# Patient Record
Sex: FEMALE | Race: BLACK OR AFRICAN AMERICAN | NOT HISPANIC OR LATINO | URBAN - METROPOLITAN AREA
[De-identification: names, ages, dates, MRNs, and addresses within clinical notes are randomized per-mention and may not be internally consistent; named-entity substitution may affect disease eponyms.]

---

## 2017-01-04 ENCOUNTER — COMMUNICATION - HEALTHEAST (OUTPATIENT)
Dept: BEHAVIORAL HEALTH | Facility: CLINIC | Age: 42
End: 2017-01-04

## 2017-01-04 DIAGNOSIS — F11.20 OPIOID USE DISORDER, SEVERE, DEPENDENCE (H): ICD-10-CM

## 2017-01-09 ENCOUNTER — AMBULATORY - HEALTHEAST (OUTPATIENT)
Dept: BEHAVIORAL HEALTH | Facility: CLINIC | Age: 42
End: 2017-01-09

## 2017-01-09 ENCOUNTER — COMMUNICATION - HEALTHEAST (OUTPATIENT)
Dept: BEHAVIORAL HEALTH | Facility: CLINIC | Age: 42
End: 2017-01-09

## 2017-01-09 DIAGNOSIS — F11.20 OPIOID USE DISORDER, SEVERE, DEPENDENCE (H): ICD-10-CM

## 2017-01-10 ENCOUNTER — COMMUNICATION - HEALTHEAST (OUTPATIENT)
Dept: BEHAVIORAL HEALTH | Facility: CLINIC | Age: 42
End: 2017-01-10

## 2017-01-10 DIAGNOSIS — F13.11 SEDATIVE, HYPNOTIC OR ANXIOLYTIC USE DISORDER, MILD, IN EARLY REMISSION (H): ICD-10-CM

## 2017-01-18 ENCOUNTER — AMBULATORY - HEALTHEAST (OUTPATIENT)
Dept: LAB | Facility: CLINIC | Age: 42
End: 2017-01-18

## 2017-01-18 ENCOUNTER — COMMUNICATION - HEALTHEAST (OUTPATIENT)
Dept: BEHAVIORAL HEALTH | Facility: CLINIC | Age: 42
End: 2017-01-18

## 2017-01-18 DIAGNOSIS — F41.1 GENERALIZED ANXIETY DISORDER: ICD-10-CM

## 2017-01-18 DIAGNOSIS — F13.10 SEDATIVE, HYPNOTIC OR ANXIOLYTIC USE DISORDER, MILD, ABUSE (H): ICD-10-CM

## 2017-01-18 DIAGNOSIS — F11.20 OPIOID USE DISORDER, SEVERE, DEPENDENCE (H): ICD-10-CM

## 2017-01-19 ENCOUNTER — COMMUNICATION - HEALTHEAST (OUTPATIENT)
Dept: BEHAVIORAL HEALTH | Facility: CLINIC | Age: 42
End: 2017-01-19

## 2017-01-25 ENCOUNTER — COMMUNICATION - HEALTHEAST (OUTPATIENT)
Dept: BEHAVIORAL HEALTH | Facility: CLINIC | Age: 42
End: 2017-01-25

## 2017-01-25 DIAGNOSIS — F31.9 BIPOLAR DISORDER, UNSPECIFIED (H): ICD-10-CM

## 2017-01-26 ENCOUNTER — COMMUNICATION - HEALTHEAST (OUTPATIENT)
Dept: BEHAVIORAL HEALTH | Facility: CLINIC | Age: 42
End: 2017-01-26

## 2017-01-26 ENCOUNTER — AMBULATORY - HEALTHEAST (OUTPATIENT)
Dept: LAB | Facility: CLINIC | Age: 42
End: 2017-01-26

## 2017-01-26 DIAGNOSIS — F11.20 OPIOID USE DISORDER, SEVERE, DEPENDENCE (H): ICD-10-CM

## 2017-02-01 ENCOUNTER — AMBULATORY - HEALTHEAST (OUTPATIENT)
Dept: BEHAVIORAL HEALTH | Facility: CLINIC | Age: 42
End: 2017-02-01

## 2017-02-01 DIAGNOSIS — F11.20 OPIOID USE DISORDER, SEVERE, DEPENDENCE (H): ICD-10-CM

## 2017-02-01 ASSESSMENT — MIFFLIN-ST. JEOR: SCORE: 1270.5

## 2017-02-02 ENCOUNTER — COMMUNICATION - HEALTHEAST (OUTPATIENT)
Dept: BEHAVIORAL HEALTH | Facility: CLINIC | Age: 42
End: 2017-02-02

## 2017-02-06 ENCOUNTER — RECORDS - HEALTHEAST (OUTPATIENT)
Dept: ADMINISTRATIVE | Facility: OTHER | Age: 42
End: 2017-02-06

## 2017-02-23 ENCOUNTER — COMMUNICATION - HEALTHEAST (OUTPATIENT)
Dept: BEHAVIORAL HEALTH | Facility: CLINIC | Age: 42
End: 2017-02-23

## 2017-02-23 DIAGNOSIS — F31.9 BIPOLAR DISORDER, UNSPECIFIED (H): ICD-10-CM

## 2017-02-23 DIAGNOSIS — F41.1 GENERALIZED ANXIETY DISORDER: ICD-10-CM

## 2017-02-23 DIAGNOSIS — F13.11 SEDATIVE, HYPNOTIC OR ANXIOLYTIC USE DISORDER, MILD, IN EARLY REMISSION (H): ICD-10-CM

## 2017-02-23 DIAGNOSIS — F13.10 SEDATIVE, HYPNOTIC OR ANXIOLYTIC USE DISORDER, MILD, ABUSE (H): ICD-10-CM

## 2017-02-27 ENCOUNTER — COMMUNICATION - HEALTHEAST (OUTPATIENT)
Dept: BEHAVIORAL HEALTH | Facility: CLINIC | Age: 42
End: 2017-02-27

## 2017-02-28 ENCOUNTER — AMBULATORY - HEALTHEAST (OUTPATIENT)
Dept: BEHAVIORAL HEALTH | Facility: CLINIC | Age: 42
End: 2017-02-28

## 2017-02-28 DIAGNOSIS — F33.9 DEPRESSION, MAJOR, RECURRENT (H): ICD-10-CM

## 2017-02-28 DIAGNOSIS — F11.20 OPIOID USE DISORDER, SEVERE, DEPENDENCE (H): ICD-10-CM

## 2017-02-28 ASSESSMENT — MIFFLIN-ST. JEOR: SCORE: 1261.43

## 2017-03-15 ENCOUNTER — OFFICE VISIT - HEALTHEAST (OUTPATIENT)
Dept: BEHAVIORAL HEALTH | Facility: CLINIC | Age: 42
End: 2017-03-15

## 2017-03-15 DIAGNOSIS — F11.21 OPIOID USE DISORDER, SEVERE, IN SUSTAINED REMISSION, ON MAINTENANCE THERAPY (H): ICD-10-CM

## 2017-03-15 DIAGNOSIS — F11.20 OPIOID USE DISORDER, SEVERE, DEPENDENCE (H): ICD-10-CM

## 2017-03-15 DIAGNOSIS — F41.1 GENERALIZED ANXIETY DISORDER: ICD-10-CM

## 2017-03-15 DIAGNOSIS — F13.11 SEDATIVE, HYPNOTIC OR ANXIOLYTIC USE DISORDER, MILD, IN EARLY REMISSION (H): ICD-10-CM

## 2017-03-15 ASSESSMENT — MIFFLIN-ST. JEOR: SCORE: 1334.01

## 2017-03-27 ENCOUNTER — COMMUNICATION - HEALTHEAST (OUTPATIENT)
Dept: BEHAVIORAL HEALTH | Facility: CLINIC | Age: 42
End: 2017-03-27

## 2017-03-27 DIAGNOSIS — F41.1 GENERALIZED ANXIETY DISORDER: ICD-10-CM

## 2017-03-27 DIAGNOSIS — F13.10 SEDATIVE, HYPNOTIC OR ANXIOLYTIC USE DISORDER, MILD, ABUSE (H): ICD-10-CM

## 2017-04-10 ENCOUNTER — AMBULATORY - HEALTHEAST (OUTPATIENT)
Dept: BEHAVIORAL HEALTH | Facility: CLINIC | Age: 42
End: 2017-04-10

## 2017-04-10 DIAGNOSIS — F31.9 BIPOLAR DISORDER, UNSPECIFIED (H): ICD-10-CM

## 2017-04-10 DIAGNOSIS — G25.0 ESSENTIAL TREMOR: ICD-10-CM

## 2017-04-10 DIAGNOSIS — F11.20 OPIOID USE DISORDER, SEVERE, DEPENDENCE (H): ICD-10-CM

## 2017-04-10 DIAGNOSIS — F41.1 GENERALIZED ANXIETY DISORDER: ICD-10-CM

## 2017-04-10 DIAGNOSIS — G47.00 PERSISTENT DISORDER OF INITIATING OR MAINTAINING SLEEP: ICD-10-CM

## 2017-04-10 DIAGNOSIS — F13.11 SEDATIVE, HYPNOTIC OR ANXIOLYTIC USE DISORDER, MILD, IN EARLY REMISSION (H): ICD-10-CM

## 2017-04-10 DIAGNOSIS — F33.9 DEPRESSION, MAJOR, RECURRENT (H): ICD-10-CM

## 2017-04-10 ASSESSMENT — MIFFLIN-ST. JEOR: SCORE: 1315.86

## 2017-04-28 ENCOUNTER — COMMUNICATION - HEALTHEAST (OUTPATIENT)
Dept: BEHAVIORAL HEALTH | Facility: CLINIC | Age: 42
End: 2017-04-28

## 2017-04-28 DIAGNOSIS — F13.10 SEDATIVE, HYPNOTIC OR ANXIOLYTIC USE DISORDER, MILD, ABUSE (H): ICD-10-CM

## 2017-04-28 DIAGNOSIS — F41.1 GENERALIZED ANXIETY DISORDER: ICD-10-CM

## 2017-05-08 ENCOUNTER — AMBULATORY - HEALTHEAST (OUTPATIENT)
Dept: BEHAVIORAL HEALTH | Facility: CLINIC | Age: 42
End: 2017-05-08

## 2017-05-08 DIAGNOSIS — K21.9 GERD (GASTROESOPHAGEAL REFLUX DISEASE): ICD-10-CM

## 2017-05-08 DIAGNOSIS — F41.1 GENERALIZED ANXIETY DISORDER: ICD-10-CM

## 2017-05-08 DIAGNOSIS — F13.11 SEDATIVE, HYPNOTIC OR ANXIOLYTIC USE DISORDER, MILD, IN EARLY REMISSION (H): ICD-10-CM

## 2017-05-08 DIAGNOSIS — F31.9 BIPOLAR DISORDER, UNSPECIFIED (H): ICD-10-CM

## 2017-05-08 DIAGNOSIS — F33.9 DEPRESSION, MAJOR, RECURRENT (H): ICD-10-CM

## 2017-05-08 DIAGNOSIS — G25.0 ESSENTIAL TREMOR: ICD-10-CM

## 2017-05-08 DIAGNOSIS — F11.20 OPIOID USE DISORDER, SEVERE, DEPENDENCE (H): ICD-10-CM

## 2017-05-08 ASSESSMENT — MIFFLIN-ST. JEOR: SCORE: 1320.4

## 2017-05-30 ENCOUNTER — COMMUNICATION - HEALTHEAST (OUTPATIENT)
Dept: BEHAVIORAL HEALTH | Facility: CLINIC | Age: 42
End: 2017-05-30

## 2017-05-30 DIAGNOSIS — F41.1 GENERALIZED ANXIETY DISORDER: ICD-10-CM

## 2017-05-30 DIAGNOSIS — F13.10 SEDATIVE, HYPNOTIC OR ANXIOLYTIC USE DISORDER, MILD, ABUSE (H): ICD-10-CM

## 2017-06-05 ENCOUNTER — AMBULATORY - HEALTHEAST (OUTPATIENT)
Dept: BEHAVIORAL HEALTH | Facility: CLINIC | Age: 42
End: 2017-06-05

## 2017-06-05 DIAGNOSIS — F11.20 OPIOID USE DISORDER, SEVERE, DEPENDENCE (H): ICD-10-CM

## 2017-06-05 DIAGNOSIS — F13.11 SEDATIVE, HYPNOTIC OR ANXIOLYTIC USE DISORDER, MILD, IN EARLY REMISSION (H): ICD-10-CM

## 2017-06-05 ASSESSMENT — MIFFLIN-ST. JEOR: SCORE: 1320.4

## 2017-06-29 ENCOUNTER — COMMUNICATION - HEALTHEAST (OUTPATIENT)
Dept: BEHAVIORAL HEALTH | Facility: CLINIC | Age: 42
End: 2017-06-29

## 2017-06-29 DIAGNOSIS — F13.10 SEDATIVE, HYPNOTIC OR ANXIOLYTIC USE DISORDER, MILD, ABUSE (H): ICD-10-CM

## 2017-06-29 DIAGNOSIS — F41.1 GENERALIZED ANXIETY DISORDER: ICD-10-CM

## 2017-07-01 ENCOUNTER — COMMUNICATION - HEALTHEAST (OUTPATIENT)
Dept: BEHAVIORAL HEALTH | Facility: CLINIC | Age: 42
End: 2017-07-01

## 2017-07-01 DIAGNOSIS — G25.0 ESSENTIAL TREMOR: ICD-10-CM

## 2017-07-01 DIAGNOSIS — F31.9 BIPOLAR DISORDER, UNSPECIFIED (H): ICD-10-CM

## 2017-07-01 DIAGNOSIS — F33.9 DEPRESSION, MAJOR, RECURRENT (H): ICD-10-CM

## 2017-07-01 DIAGNOSIS — F41.1 GENERALIZED ANXIETY DISORDER: ICD-10-CM

## 2017-07-05 ENCOUNTER — COMMUNICATION - HEALTHEAST (OUTPATIENT)
Dept: BEHAVIORAL HEALTH | Facility: CLINIC | Age: 42
End: 2017-07-05

## 2017-07-05 DIAGNOSIS — F11.20 OPIOID USE DISORDER, SEVERE, DEPENDENCE (H): ICD-10-CM

## 2017-07-10 ENCOUNTER — COMMUNICATION - HEALTHEAST (OUTPATIENT)
Dept: BEHAVIORAL HEALTH | Facility: CLINIC | Age: 42
End: 2017-07-10

## 2017-07-10 DIAGNOSIS — F13.11 SEDATIVE, HYPNOTIC OR ANXIOLYTIC USE DISORDER, MILD, IN EARLY REMISSION (H): ICD-10-CM

## 2017-07-11 ENCOUNTER — AMBULATORY - HEALTHEAST (OUTPATIENT)
Dept: LAB | Facility: CLINIC | Age: 42
End: 2017-07-11

## 2017-07-11 DIAGNOSIS — F11.20 OPIOID USE DISORDER, SEVERE, DEPENDENCE (H): ICD-10-CM

## 2017-07-23 ENCOUNTER — COMMUNICATION - HEALTHEAST (OUTPATIENT)
Dept: BEHAVIORAL HEALTH | Facility: CLINIC | Age: 42
End: 2017-07-23

## 2017-07-23 DIAGNOSIS — F13.11 SEDATIVE, HYPNOTIC OR ANXIOLYTIC USE DISORDER, MILD, IN EARLY REMISSION (H): ICD-10-CM

## 2017-07-26 ENCOUNTER — COMMUNICATION - HEALTHEAST (OUTPATIENT)
Dept: BEHAVIORAL HEALTH | Facility: CLINIC | Age: 42
End: 2017-07-26

## 2017-07-26 DIAGNOSIS — F13.10 SEDATIVE, HYPNOTIC OR ANXIOLYTIC USE DISORDER, MILD, ABUSE (H): ICD-10-CM

## 2017-07-26 DIAGNOSIS — F41.1 GENERALIZED ANXIETY DISORDER: ICD-10-CM

## 2017-08-09 ENCOUNTER — COMMUNICATION - HEALTHEAST (OUTPATIENT)
Dept: BEHAVIORAL HEALTH | Facility: CLINIC | Age: 42
End: 2017-08-09

## 2017-08-09 ENCOUNTER — AMBULATORY - HEALTHEAST (OUTPATIENT)
Dept: BEHAVIORAL HEALTH | Facility: CLINIC | Age: 42
End: 2017-08-09

## 2017-08-09 DIAGNOSIS — F11.20 OPIOID USE DISORDER, SEVERE, DEPENDENCE (H): ICD-10-CM

## 2017-08-09 DIAGNOSIS — F31.9 BIPOLAR DISORDER, UNSPECIFIED (H): ICD-10-CM

## 2017-08-09 ASSESSMENT — MIFFLIN-ST. JEOR: SCORE: 1309.61

## 2017-08-24 ENCOUNTER — COMMUNICATION - HEALTHEAST (OUTPATIENT)
Dept: BEHAVIORAL HEALTH | Facility: CLINIC | Age: 42
End: 2017-08-24

## 2017-08-24 DIAGNOSIS — F13.11 SEDATIVE, HYPNOTIC OR ANXIOLYTIC USE DISORDER, MILD, IN EARLY REMISSION (H): ICD-10-CM

## 2017-08-24 DIAGNOSIS — F13.10 SEDATIVE, HYPNOTIC OR ANXIOLYTIC USE DISORDER, MILD, ABUSE (H): ICD-10-CM

## 2017-08-24 DIAGNOSIS — F41.1 GENERALIZED ANXIETY DISORDER: ICD-10-CM

## 2017-08-27 ENCOUNTER — COMMUNICATION - HEALTHEAST (OUTPATIENT)
Dept: BEHAVIORAL HEALTH | Facility: CLINIC | Age: 42
End: 2017-08-27

## 2017-08-27 DIAGNOSIS — F33.9 DEPRESSION, MAJOR, RECURRENT (H): ICD-10-CM

## 2017-08-27 DIAGNOSIS — F31.9 BIPOLAR DISORDER, UNSPECIFIED (H): ICD-10-CM

## 2017-08-27 DIAGNOSIS — G25.0 ESSENTIAL TREMOR: ICD-10-CM

## 2017-08-27 DIAGNOSIS — F41.1 GENERALIZED ANXIETY DISORDER: ICD-10-CM

## 2017-09-06 ENCOUNTER — AMBULATORY - HEALTHEAST (OUTPATIENT)
Dept: LAB | Facility: CLINIC | Age: 42
End: 2017-09-06

## 2017-09-06 ENCOUNTER — COMMUNICATION - HEALTHEAST (OUTPATIENT)
Dept: BEHAVIORAL HEALTH | Facility: CLINIC | Age: 42
End: 2017-09-06

## 2017-09-06 DIAGNOSIS — F11.20 OPIOID USE DISORDER, SEVERE, DEPENDENCE (H): ICD-10-CM

## 2017-09-20 ENCOUNTER — COMMUNICATION - HEALTHEAST (OUTPATIENT)
Dept: BEHAVIORAL HEALTH | Facility: CLINIC | Age: 42
End: 2017-09-20

## 2017-09-20 DIAGNOSIS — F41.1 GENERALIZED ANXIETY DISORDER: ICD-10-CM

## 2017-09-20 DIAGNOSIS — F13.10 SEDATIVE, HYPNOTIC OR ANXIOLYTIC USE DISORDER, MILD, ABUSE (H): ICD-10-CM

## 2017-10-02 ENCOUNTER — COMMUNICATION - HEALTHEAST (OUTPATIENT)
Dept: BEHAVIORAL HEALTH | Facility: CLINIC | Age: 42
End: 2017-10-02

## 2017-10-02 DIAGNOSIS — F41.1 GENERALIZED ANXIETY DISORDER: ICD-10-CM

## 2017-10-02 DIAGNOSIS — G25.0 ESSENTIAL TREMOR: ICD-10-CM

## 2017-10-02 DIAGNOSIS — F33.9 DEPRESSION, MAJOR, RECURRENT (H): ICD-10-CM

## 2017-10-04 ENCOUNTER — AMBULATORY - HEALTHEAST (OUTPATIENT)
Dept: BEHAVIORAL HEALTH | Facility: CLINIC | Age: 42
End: 2017-10-04

## 2017-10-04 ENCOUNTER — OFFICE VISIT - HEALTHEAST (OUTPATIENT)
Dept: BEHAVIORAL HEALTH | Facility: CLINIC | Age: 42
End: 2017-10-04

## 2017-10-04 DIAGNOSIS — F11.20 OPIOID USE DISORDER, SEVERE, DEPENDENCE (H): ICD-10-CM

## 2017-10-04 DIAGNOSIS — F13.11 SEDATIVE, HYPNOTIC OR ANXIOLYTIC USE DISORDER, MILD, IN EARLY REMISSION (H): ICD-10-CM

## 2017-10-04 DIAGNOSIS — F33.1 DEPRESSION, MAJOR, RECURRENT, MODERATE (H): ICD-10-CM

## 2017-10-04 DIAGNOSIS — F11.21 OPIOID USE DISORDER, SEVERE, IN SUSTAINED REMISSION, ON MAINTENANCE THERAPY (H): ICD-10-CM

## 2017-10-04 DIAGNOSIS — Z79.899 POLYPHARMACY: ICD-10-CM

## 2017-10-04 ASSESSMENT — MIFFLIN-ST. JEOR: SCORE: 1290.92

## 2017-10-12 ENCOUNTER — AMBULATORY - HEALTHEAST (OUTPATIENT)
Dept: BEHAVIORAL HEALTH | Facility: CLINIC | Age: 42
End: 2017-10-12

## 2017-10-12 DIAGNOSIS — F11.20 OPIOID USE DISORDER, SEVERE, DEPENDENCE (H): ICD-10-CM

## 2017-10-12 ASSESSMENT — MIFFLIN-ST. JEOR: SCORE: 1263.7

## 2017-10-16 ENCOUNTER — COMMUNICATION - HEALTHEAST (OUTPATIENT)
Dept: BEHAVIORAL HEALTH | Facility: CLINIC | Age: 42
End: 2017-10-16

## 2017-10-16 DIAGNOSIS — F13.10 SEDATIVE, HYPNOTIC OR ANXIOLYTIC USE DISORDER, MILD, ABUSE (H): ICD-10-CM

## 2017-10-16 DIAGNOSIS — F41.1 GENERALIZED ANXIETY DISORDER: ICD-10-CM

## 2017-10-19 ENCOUNTER — AMBULATORY - HEALTHEAST (OUTPATIENT)
Dept: BEHAVIORAL HEALTH | Facility: CLINIC | Age: 42
End: 2017-10-19

## 2017-10-19 DIAGNOSIS — F11.20 OPIOID USE DISORDER, SEVERE, DEPENDENCE (H): ICD-10-CM

## 2017-10-19 ASSESSMENT — MIFFLIN-ST. JEOR: SCORE: 1295.46

## 2017-10-25 ENCOUNTER — AMBULATORY - HEALTHEAST (OUTPATIENT)
Dept: BEHAVIORAL HEALTH | Facility: CLINIC | Age: 42
End: 2017-10-25

## 2017-10-25 DIAGNOSIS — F11.21 OPIOID USE DISORDER, SEVERE, IN SUSTAINED REMISSION, ON MAINTENANCE THERAPY (H): ICD-10-CM

## 2017-10-25 DIAGNOSIS — F11.20 OPIOID USE DISORDER, SEVERE, DEPENDENCE (H): ICD-10-CM

## 2017-10-25 ASSESSMENT — MIFFLIN-ST. JEOR: SCORE: 1273.14

## 2017-10-26 ENCOUNTER — AMBULATORY - HEALTHEAST (OUTPATIENT)
Dept: BEHAVIORAL HEALTH | Facility: CLINIC | Age: 42
End: 2017-10-26

## 2017-11-01 ENCOUNTER — COMMUNICATION - HEALTHEAST (OUTPATIENT)
Dept: BEHAVIORAL HEALTH | Facility: CLINIC | Age: 42
End: 2017-11-01

## 2017-11-02 ENCOUNTER — COMMUNICATION - HEALTHEAST (OUTPATIENT)
Dept: BEHAVIORAL HEALTH | Facility: CLINIC | Age: 42
End: 2017-11-02

## 2017-11-02 ENCOUNTER — AMBULATORY - HEALTHEAST (OUTPATIENT)
Dept: LAB | Facility: CLINIC | Age: 42
End: 2017-11-02

## 2017-11-02 DIAGNOSIS — F11.21 OPIOID USE DISORDER, SEVERE, IN SUSTAINED REMISSION, ON MAINTENANCE THERAPY (H): ICD-10-CM

## 2017-11-02 DIAGNOSIS — F11.20 OPIOID USE DISORDER, SEVERE, DEPENDENCE (H): ICD-10-CM

## 2017-11-03 ENCOUNTER — COMMUNICATION - HEALTHEAST (OUTPATIENT)
Dept: BEHAVIORAL HEALTH | Facility: CLINIC | Age: 42
End: 2017-11-03

## 2017-11-03 DIAGNOSIS — F11.20 OPIOID USE DISORDER, SEVERE, DEPENDENCE (H): ICD-10-CM

## 2017-11-14 ENCOUNTER — AMBULATORY - HEALTHEAST (OUTPATIENT)
Dept: BEHAVIORAL HEALTH | Facility: CLINIC | Age: 42
End: 2017-11-14

## 2017-11-14 DIAGNOSIS — F11.20 OPIOID USE DISORDER, SEVERE, DEPENDENCE (H): ICD-10-CM

## 2017-11-14 ASSESSMENT — MIFFLIN-ST. JEOR: SCORE: 1272.78

## 2017-11-15 ENCOUNTER — COMMUNICATION - HEALTHEAST (OUTPATIENT)
Dept: BEHAVIORAL HEALTH | Facility: CLINIC | Age: 42
End: 2017-11-15

## 2017-11-15 DIAGNOSIS — F41.1 GENERALIZED ANXIETY DISORDER: ICD-10-CM

## 2017-11-15 DIAGNOSIS — F13.10 SEDATIVE, HYPNOTIC OR ANXIOLYTIC USE DISORDER, MILD, ABUSE (H): ICD-10-CM

## 2017-11-30 ENCOUNTER — AMBULATORY - HEALTHEAST (OUTPATIENT)
Dept: BEHAVIORAL HEALTH | Facility: CLINIC | Age: 42
End: 2017-11-30

## 2017-11-30 DIAGNOSIS — F11.20 OPIOID USE DISORDER, SEVERE, DEPENDENCE (H): ICD-10-CM

## 2017-11-30 ASSESSMENT — MIFFLIN-ST. JEOR: SCORE: 1263.7

## 2017-12-14 ENCOUNTER — COMMUNICATION - HEALTHEAST (OUTPATIENT)
Dept: BEHAVIORAL HEALTH | Facility: CLINIC | Age: 42
End: 2017-12-14

## 2017-12-14 ENCOUNTER — AMBULATORY - HEALTHEAST (OUTPATIENT)
Dept: BEHAVIORAL HEALTH | Facility: CLINIC | Age: 42
End: 2017-12-14

## 2017-12-14 DIAGNOSIS — F11.20 OPIOID USE DISORDER, SEVERE, DEPENDENCE (H): ICD-10-CM

## 2017-12-14 DIAGNOSIS — F41.1 GENERALIZED ANXIETY DISORDER: ICD-10-CM

## 2017-12-14 DIAGNOSIS — F13.10 SEDATIVE, HYPNOTIC OR ANXIOLYTIC USE DISORDER, MILD, ABUSE (H): ICD-10-CM

## 2017-12-14 ASSESSMENT — MIFFLIN-ST. JEOR: SCORE: 1286.38

## 2017-12-27 ENCOUNTER — AMBULATORY - HEALTHEAST (OUTPATIENT)
Dept: BEHAVIORAL HEALTH | Facility: CLINIC | Age: 42
End: 2017-12-27

## 2018-01-02 ENCOUNTER — AMBULATORY - HEALTHEAST (OUTPATIENT)
Dept: BEHAVIORAL HEALTH | Facility: CLINIC | Age: 43
End: 2018-01-02

## 2018-01-02 DIAGNOSIS — F11.20 OPIOID USE DISORDER, SEVERE, DEPENDENCE (H): ICD-10-CM

## 2018-01-02 LAB
AMPHETAMINES UR QL SCN: NORMAL
BARBITURATES UR QL: NORMAL
BENZODIAZ UR QL: NORMAL
CANNABINOIDS UR QL SCN: NORMAL
COCAINE UR QL: NORMAL
CREAT UR-MCNC: 167.4 MG/DL
METHADONE UR QL SCN: NORMAL
OPIATES UR QL SCN: NORMAL
OXYCODONE UR QL: NORMAL
PCP UR QL SCN: NORMAL

## 2018-01-02 ASSESSMENT — MIFFLIN-ST. JEOR: SCORE: 1250.1

## 2018-01-08 ENCOUNTER — COMMUNICATION - HEALTHEAST (OUTPATIENT)
Dept: BEHAVIORAL HEALTH | Facility: CLINIC | Age: 43
End: 2018-01-08

## 2018-01-16 ENCOUNTER — COMMUNICATION - HEALTHEAST (OUTPATIENT)
Dept: BEHAVIORAL HEALTH | Facility: CLINIC | Age: 43
End: 2018-01-16

## 2018-01-16 DIAGNOSIS — F13.10 SEDATIVE, HYPNOTIC OR ANXIOLYTIC USE DISORDER, MILD, ABUSE (H): ICD-10-CM

## 2018-01-16 DIAGNOSIS — F11.20 OPIOID USE DISORDER, SEVERE, DEPENDENCE (H): ICD-10-CM

## 2018-01-16 DIAGNOSIS — F41.1 GENERALIZED ANXIETY DISORDER: ICD-10-CM

## 2018-01-26 ENCOUNTER — COMMUNICATION - HEALTHEAST (OUTPATIENT)
Dept: BEHAVIORAL HEALTH | Facility: CLINIC | Age: 43
End: 2018-01-26

## 2018-01-29 ENCOUNTER — COMMUNICATION - HEALTHEAST (OUTPATIENT)
Dept: BEHAVIORAL HEALTH | Facility: CLINIC | Age: 43
End: 2018-01-29

## 2018-01-31 ENCOUNTER — OFFICE VISIT - HEALTHEAST (OUTPATIENT)
Dept: BEHAVIORAL HEALTH | Facility: CLINIC | Age: 43
End: 2018-01-31

## 2018-01-31 DIAGNOSIS — F11.20 OPIOID USE DISORDER, SEVERE, DEPENDENCE (H): ICD-10-CM

## 2018-01-31 DIAGNOSIS — F11.21 OPIOID USE DISORDER, SEVERE, IN SUSTAINED REMISSION, ON MAINTENANCE THERAPY (H): ICD-10-CM

## 2018-01-31 LAB
AMPHETAMINES UR QL SCN: NORMAL
BARBITURATES UR QL: NORMAL
BENZODIAZ UR QL: NORMAL
BUPRENORPHINE QUAL URINE LHE: ABNORMAL
CANNABINOIDS UR QL SCN: NORMAL
COCAINE UR QL: NORMAL
CREAT UR-MCNC: 219.1 MG/DL
CREAT UR-MCNC: 219.9 MG/DL
METHADONE UR QL SCN: NORMAL
OPIATES UR QL SCN: NORMAL
OXYCODONE UR QL: NORMAL
PCP UR QL SCN: NORMAL

## 2018-01-31 ASSESSMENT — MIFFLIN-ST. JEOR: SCORE: 1281.85

## 2018-02-01 ENCOUNTER — COMMUNICATION - HEALTHEAST (OUTPATIENT)
Dept: BEHAVIORAL HEALTH | Facility: CLINIC | Age: 43
End: 2018-02-01

## 2018-02-04 ENCOUNTER — COMMUNICATION - HEALTHEAST (OUTPATIENT)
Dept: BEHAVIORAL HEALTH | Facility: CLINIC | Age: 43
End: 2018-02-04

## 2018-02-04 DIAGNOSIS — F13.10 SEDATIVE, HYPNOTIC OR ANXIOLYTIC USE DISORDER, MILD, ABUSE (H): ICD-10-CM

## 2018-02-06 ENCOUNTER — COMMUNICATION - HEALTHEAST (OUTPATIENT)
Dept: BEHAVIORAL HEALTH | Facility: CLINIC | Age: 43
End: 2018-02-06

## 2018-02-14 ENCOUNTER — COMMUNICATION - HEALTHEAST (OUTPATIENT)
Dept: BEHAVIORAL HEALTH | Facility: CLINIC | Age: 43
End: 2018-02-14

## 2018-02-14 DIAGNOSIS — F41.1 GENERALIZED ANXIETY DISORDER: ICD-10-CM

## 2018-02-14 DIAGNOSIS — F33.9 DEPRESSION, MAJOR, RECURRENT (H): ICD-10-CM

## 2018-02-14 DIAGNOSIS — F13.10 SEDATIVE, HYPNOTIC OR ANXIOLYTIC USE DISORDER, MILD, ABUSE (H): ICD-10-CM

## 2018-02-14 DIAGNOSIS — G25.0 ESSENTIAL TREMOR: ICD-10-CM

## 2018-03-06 ENCOUNTER — COMMUNICATION - HEALTHEAST (OUTPATIENT)
Dept: BEHAVIORAL HEALTH | Facility: CLINIC | Age: 43
End: 2018-03-06

## 2018-03-06 ENCOUNTER — AMBULATORY - HEALTHEAST (OUTPATIENT)
Dept: BEHAVIORAL HEALTH | Facility: CLINIC | Age: 43
End: 2018-03-06

## 2018-03-06 ENCOUNTER — AMBULATORY - HEALTHEAST (OUTPATIENT)
Dept: LAB | Facility: CLINIC | Age: 43
End: 2018-03-06

## 2018-03-06 DIAGNOSIS — F11.21 OPIOID USE DISORDER, SEVERE, IN SUSTAINED REMISSION, ON MAINTENANCE THERAPY (H): ICD-10-CM

## 2018-03-06 DIAGNOSIS — F11.20 OPIOID USE DISORDER, SEVERE, DEPENDENCE (H): ICD-10-CM

## 2018-03-06 LAB
AMPHETAMINES UR QL SCN: NORMAL
BARBITURATES UR QL: NORMAL
BENZODIAZ UR QL: NORMAL
BUPRENORPHINE QUAL URINE LHE: ABNORMAL
CANNABINOIDS UR QL SCN: NORMAL
COCAINE UR QL: NORMAL
CREAT UR-MCNC: 12.9 MG/DL
CREAT UR-MCNC: 12.9 MG/DL
METHADONE UR QL SCN: NORMAL
OPIATES UR QL SCN: NORMAL
OXYCODONE UR QL: NORMAL
PCP UR QL SCN: NORMAL

## 2018-03-15 ENCOUNTER — COMMUNICATION - HEALTHEAST (OUTPATIENT)
Dept: BEHAVIORAL HEALTH | Facility: CLINIC | Age: 43
End: 2018-03-15

## 2018-03-15 DIAGNOSIS — F41.1 GENERALIZED ANXIETY DISORDER: ICD-10-CM

## 2018-03-15 DIAGNOSIS — F13.10 SEDATIVE, HYPNOTIC OR ANXIOLYTIC USE DISORDER, MILD, ABUSE (H): ICD-10-CM

## 2018-03-15 DIAGNOSIS — F33.9 DEPRESSION, MAJOR, RECURRENT (H): ICD-10-CM

## 2018-03-15 DIAGNOSIS — G25.0 ESSENTIAL TREMOR: ICD-10-CM

## 2018-03-21 ENCOUNTER — COMMUNICATION - HEALTHEAST (OUTPATIENT)
Dept: BEHAVIORAL HEALTH | Facility: CLINIC | Age: 43
End: 2018-03-21

## 2018-03-21 DIAGNOSIS — F13.10 SEDATIVE, HYPNOTIC OR ANXIOLYTIC USE DISORDER, MILD, ABUSE (H): ICD-10-CM

## 2018-04-03 ENCOUNTER — AMBULATORY - HEALTHEAST (OUTPATIENT)
Dept: BEHAVIORAL HEALTH | Facility: CLINIC | Age: 43
End: 2018-04-03

## 2018-04-03 DIAGNOSIS — F11.20 OPIOID USE DISORDER, SEVERE, DEPENDENCE (H): ICD-10-CM

## 2018-04-03 LAB
AMPHETAMINES UR QL SCN: NORMAL
BARBITURATES UR QL: NORMAL
BENZODIAZ UR QL: NORMAL
CANNABINOIDS UR QL SCN: NORMAL
COCAINE UR QL: NORMAL
CREAT UR-MCNC: 115.7 MG/DL
METHADONE UR QL SCN: NORMAL
OPIATES UR QL SCN: NORMAL
OXYCODONE UR QL: NORMAL
PCP UR QL SCN: NORMAL

## 2018-04-03 ASSESSMENT — MIFFLIN-ST. JEOR: SCORE: 1268.24

## 2018-04-11 ENCOUNTER — COMMUNICATION - HEALTHEAST (OUTPATIENT)
Dept: BEHAVIORAL HEALTH | Facility: CLINIC | Age: 43
End: 2018-04-11

## 2018-04-11 DIAGNOSIS — F13.10 SEDATIVE, HYPNOTIC OR ANXIOLYTIC USE DISORDER, MILD, ABUSE (H): ICD-10-CM

## 2018-04-11 DIAGNOSIS — F41.1 GENERALIZED ANXIETY DISORDER: ICD-10-CM

## 2018-04-11 DIAGNOSIS — F33.9 DEPRESSION, MAJOR, RECURRENT (H): ICD-10-CM

## 2018-04-11 DIAGNOSIS — G25.0 ESSENTIAL TREMOR: ICD-10-CM

## 2018-04-20 ENCOUNTER — HOSPITAL ENCOUNTER (EMERGENCY)
Facility: CLINIC | Age: 43
Discharge: HOME | End: 2018-04-20
Attending: EMERGENCY MEDICINE | Admitting: EMERGENCY MEDICINE
Payer: COMMERCIAL

## 2018-04-20 VITALS
SYSTOLIC BLOOD PRESSURE: 134 MMHG | RESPIRATION RATE: 19 BRPM | TEMPERATURE: 98.6 F | HEART RATE: 72 BPM | DIASTOLIC BLOOD PRESSURE: 89 MMHG | OXYGEN SATURATION: 100 % | BODY MASS INDEX: 28.83 KG/M2 | WEIGHT: 152.6 LBS

## 2018-04-20 DIAGNOSIS — K02.9 DENTAL CARIES: ICD-10-CM

## 2018-04-20 LAB
ALBUMIN SERPL-MCNC: 3.5 G/DL (ref 3.4–5)
ALP SERPL-CCNC: 52 U/L (ref 40–150)
ALT SERPL W P-5'-P-CCNC: 9 U/L (ref 0–50)
ANION GAP SERPL CALCULATED.3IONS-SCNC: 6 MMOL/L (ref 3–14)
APAP SERPL-MCNC: <2 MG/L (ref 10–20)
AST SERPL W P-5'-P-CCNC: 9 U/L (ref 0–45)
BILIRUB SERPL-MCNC: 0.1 MG/DL (ref 0.2–1.3)
BUN SERPL-MCNC: 9 MG/DL (ref 7–30)
CALCIUM SERPL-MCNC: 8.6 MG/DL (ref 8.5–10.1)
CHLORIDE SERPL-SCNC: 111 MMOL/L (ref 94–109)
CO2 SERPL-SCNC: 30 MMOL/L (ref 20–32)
CREAT SERPL-MCNC: 0.8 MG/DL (ref 0.52–1.04)
GFR SERPL CREATININE-BSD FRML MDRD: 79 ML/MIN/1.7M2
GLUCOSE SERPL-MCNC: 93 MG/DL (ref 70–99)
POTASSIUM SERPL-SCNC: 3.8 MMOL/L (ref 3.4–5.3)
PROT SERPL-MCNC: 7 G/DL (ref 6.8–8.8)
SODIUM SERPL-SCNC: 147 MMOL/L (ref 133–144)

## 2018-04-20 PROCEDURE — 80053 COMPREHEN METABOLIC PANEL: CPT | Performed by: EMERGENCY MEDICINE

## 2018-04-20 PROCEDURE — 80329 ANALGESICS NON-OPIOID 1 OR 2: CPT | Performed by: EMERGENCY MEDICINE

## 2018-04-20 PROCEDURE — 99284 EMERGENCY DEPT VISIT MOD MDM: CPT | Mod: Z6 | Performed by: EMERGENCY MEDICINE

## 2018-04-20 PROCEDURE — 99283 EMERGENCY DEPT VISIT LOW MDM: CPT | Performed by: EMERGENCY MEDICINE

## 2018-04-20 RX ORDER — PENICILLIN V POTASSIUM 500 MG/1
500 TABLET, FILM COATED ORAL 4 TIMES DAILY
Qty: 40 TABLET | Refills: 0 | Status: SHIPPED | OUTPATIENT
Start: 2018-04-20 | End: 2018-04-30

## 2018-04-20 NOTE — ED AVS SNAPSHOT
[facility], [place], Emergency Department    [address]    [facility] [address]    Phone:  [phone]    Fax:  [phone]                                       Jennifer Moreno   MRN: [id number]    Department:  [facility], Emergency Department   Date of Visit:  4/20/2018           After Visit Summary Signature Page     I have received my discharge instructions, and my questions have been answered. I have discussed any challenges I see with this plan with the nurse or doctor.    ..........................................................................................................................................  Patient/Patient Representative Signature      ..........................................................................................................................................  Patient Representative Print Name and Relationship to Patient    ..................................................               ................................................  Date                                            Time    ..........................................................................................................................................  Reviewed by Signature/Title    ...................................................              ..............................................  Date                                                            Time

## 2018-04-20 NOTE — ED AVS SNAPSHOT
" [facility], Emergency Department    [address]    [place] [address]    Phone:  [phone]    Fax:  [phone]                                       Jennifer Moreno   MRN: [id number]    Department:  [facility], Emergency Department   Date of Visit:  4/20/2018           Patient Information     Date Of Birth          1975        Your diagnoses for this visit were:     Dental caries        You were seen by Abdulkadir Julio MD.        Discharge Instructions         Please make an appointment to follow up with   [facility] (phone: [phone]) OR  Dental Clinic at [facility] [phone]    Dental Cavity    A dental cavity is a pit or crater in the surface of a tooth. This exposes the sensitive inner layer of the tooth and causes pain. If the cavity isn t treated, it will get bigger. It may enter the pulp and cause an infection or abscess in the bone at the root end (apex) of the tooth. An infection in the tooth is a much more serious problem than a cavity. If the tooth gets infected, you will need a root canal or the entire tooth taken out (extraction).  The pain in your tooth may be made worse by eating sweets or drinking hot or cold beverages. It may spread from the tooth to your ear or the area of your jaw on the same side.  Home care  Follow these tips when caring for yourself at home:    Don't have sweets and hot and cold foods and drinks. Your tooth may be sensitive to changes in temperature.    If your tooth is chipped or cracked, or if there is a large open cavity, put oil of cloves directly on the tooth to relieve pain. You can buy oil of cloves at drugstores. Some pharmacies carry an over-the-counter \"toothache kit.\" This contains a paste that you can put on the exposed tooth to make it less sensitive.    Put a cold pack on your jaw over the sore area to help reduce pain.    You may use over-the-counter medicine to ease pain, unless another medicine was prescribed. If " you have chronic liver or kidney disease, talk with your healthcare provider before using acetaminophen or ibuprofen. Also talk with your provider if you ve had a stomach ulcer or GI bleeding.    If you have signs of an infection, you will be given an antibiotic. Take it as directed.  Follow-up care  Follow up with your dentist, or as advised. Your pain may go away with the treatment given today. But only a dentist can fully look at and treat this problem to prevent further tooth damage.  Call 911  Call 911 if any of these occur:    Difficulty swallowing or breathing    Weakness or fainting    Unusual drowsiness    Headache or stiff neck  When to seek medical advice  Call your healthcare provider right away if any of these occur:    Redness or swelling of the face    Pain gets worse or spreads to your neck    Fever of 100.4  F (38 C) or higher, or as directed by your healthcare provider    Pus drains from the tooth or gum  Date Last Reviewed: 10/1/2016    8436-2087 [employer]. [address] [place] [address]. All rights reserved. This information is not intended as a substitute for professional medical care. Always follow your healthcare professional's instructions.          24 Hour Appointment Hotline       To make an appointment at any [facility], call [phone] ([phone]). If you don't have a family doctor or clinic, we will help you find one. [facility] clinics are conveniently located to serve the needs of you and your family.             Review of your medicines      START taking        Dose / Directions Last dose taken    penicillin V potassium 500 MG tablet   Commonly known as:  VEETID   Dose:  500 mg   Quantity:  40 tablet        Take 1 tablet (500 mg) by mouth 4 times daily for 10 days   Refills:  0          Our records show that you are taking the medicines listed below. If these are incorrect, please call your family doctor or clinic.        Dose / Directions Last  dose taken    albuterol 108 (90 Base) MCG/ACT Inhaler   Commonly known as:  PROAIR HFA/PROVENTIL HFA/VENTOLIN HFA   Dose:  2 puff   Quantity:  1 Inhaler        Inhale 2 puffs into the lungs every 6 hours.   Refills:  2        OMEPRAZOLE PO        Take by mouth every morning   Refills:  0        oxyCODONE IR 5 MG tablet   Commonly known as:  ROXICODONE   Dose:  5 mg   Quantity:  10 tablet        Take 1 tablet by mouth every 6 hours as needed for pain for 10 doses.   Refills:  0        PROPRANOLOL HCL PO        Take by mouth At Bedtime   Refills:  0        RISPERDAL PO        Take by mouth At Bedtime   Refills:  0        SUBOXONE 8-2 MG Subl sublingual tablet   Dose:  1 tablet   Generic drug:  buprenorphine-naloxone        Place 1 tablet under the tongue 2 times daily.   Refills:  0        TYLENOL PO   Dose:  1000 mg        Take 1,000 mg by mouth   Refills:  0        ZOLOFT 25 MG tablet   Dose:  25 mg   Generic drug:  sertraline        Take 25 mg by mouth daily.   Refills:  0                Prescriptions were sent or printed at these locations (1 Prescription)                   Other Prescriptions                Printed at Department/Unit printer (1 of 1)         penicillin V potassium (VEETID) 500 MG tablet                Procedures and tests performed during your visit     Acetaminophen level    Comprehensive metabolic panel      Orders Needing Specimen Collection     None      Pending Results     No orders found from 4/18/2018 to 4/21/2018.            Pending Culture Results     No orders found from 4/18/2018 to 4/21/2018.            Pending Results Instructions     If you had any lab results that were not finalized at the time of your Discharge, you can call the ED Lab Result RN at [phone]. You will be contacted by this team for any positive Lab results or changes in treatment. The nurses are available 7 days a week from 10A to 6:30P.  You can leave a message 24 hours per day and they will return your call.    "     Thank you for choosing [facility]       Thank you for choosing [facility] for your care. Our goal is always to provide you with excellent care. Hearing back from our patients is one way we can continue to improve our services. Please take a few minutes to complete the written survey that you may receive in the mail after you visit with us. Thank you!        [employer] Information     [facility] lets you send messages to your doctor, view your test results, renew your prescriptions, schedule appointments and more. To sign up, go to [email or web address] . Click on \"Log in\" on the left side of the screen, which will take you to the Welcome page. Then click on \"Sign up Now\" on the right side of the page.     You will be asked to enter the access code listed below, as well as some personal information. Please follow the directions to create your username and password.     Your access code is: [email or web address]  Expires: 2018  2:01 PM     Your access code will  in 90 days. If you need help or a new code, please call your [facility] clinic or [phone].        Care EveryWhere ID     This is your Care EveryWhere ID. This could be used by other organizations to access your [facility] medical records  [id number]        Equal Access to Services     ARSLAN METZ : Lesly pradoo Orion, waaxda luqadaha, qaybta kaalmada adehongyada, susi ledesma. So [facility] [phone].    ATENCIÓN: Si habla español, tiene a flor disposición servicios gratuitos de asistencia lingüística. Llame al [phone].    We comply with applicable federal civil rights laws and [place] laws. We do not discriminate on the basis of race, color, national origin, age, disability, sex, sexual orientation, or gender identity.            After Visit Summary       This is your record. Keep this with you and show to your community pharmacist(s) and doctor(s) at your next visit.                  "

## 2018-04-20 NOTE — DISCHARGE INSTRUCTIONS
"  Please make an appointment to follow up with   [facility] (phone: [phone]) OR  Dental Clinic at [facility] [phone]    Dental Cavity    A dental cavity is a pit or crater in the surface of a tooth. This exposes the sensitive inner layer of the tooth and causes pain. If the cavity isn t treated, it will get bigger. It may enter the pulp and cause an infection or abscess in the bone at the root end (apex) of the tooth. An infection in the tooth is a much more serious problem than a cavity. If the tooth gets infected, you will need a root canal or the entire tooth taken out (extraction).  The pain in your tooth may be made worse by eating sweets or drinking hot or cold beverages. It may spread from the tooth to your ear or the area of your jaw on the same side.  Home care  Follow these tips when caring for yourself at home:    Don't have sweets and hot and cold foods and drinks. Your tooth may be sensitive to changes in temperature.    If your tooth is chipped or cracked, or if there is a large open cavity, put oil of cloves directly on the tooth to relieve pain. You can buy oil of cloves at drugstores. Some pharmacies carry an over-the-counter \"toothache kit.\" This contains a paste that you can put on the exposed tooth to make it less sensitive.    Put a cold pack on your jaw over the sore area to help reduce pain.    You may use over-the-counter medicine to ease pain, unless another medicine was prescribed. If you have chronic liver or kidney disease, talk with your healthcare provider before using acetaminophen or ibuprofen. Also talk with your provider if you ve had a stomach ulcer or GI bleeding.    If you have signs of an infection, you will be given an antibiotic. Take it as directed.  Follow-up care  Follow up with your dentist, or as advised. Your pain may go away with the treatment given today. But only a dentist can fully look at and treat this problem to prevent further tooth " damage.  Call 911  Call 911 if any of these occur:    Difficulty swallowing or breathing    Weakness or fainting    Unusual drowsiness    Headache or stiff neck  When to seek medical advice  Call your healthcare provider right away if any of these occur:    Redness or swelling of the face    Pain gets worse or spreads to your neck    Fever of 100.4  F (38 C) or higher, or as directed by your healthcare provider    Pus drains from the tooth or gum  Date Last Reviewed: 10/1/2016    3124-4163 [employer]. [address] [place], [place] [address]. All rights reserved. This information is not intended as a substitute for professional medical care. Always follow your healthcare professional's instructions.

## 2018-04-20 NOTE — ED TRIAGE NOTES
Patient reports generalized dental pain, expresses that she has multiple teeth that need to be removed/repaired.    Patient has been taking 2,000mg tylenol Q4-6H for 5 days, denies abdominal pain.

## 2018-04-20 NOTE — ED PROVIDER NOTES
History     Chief Complaint   Patient presents with     Dental Pain     Patient c/o generalized mouth pain, reports she was eating a waffle yesterday which broke her left lower posterior filling off.  Patient reports she has multiple teeth that need to be repaired/removed.     Medication Problem     Patient has been taking 2,000mg of tylenol every 4-6 hours for approximately 5 days     HPI  Jennifer Moreno is a 42 year old female with a history of intermittent asthma, and opoid type dependence who presents to the Emergency Department for intermittent left lower molar and upper incisor pain accompanied with jaw pain. She reports that her symptoms began intermittently on and off for a month, and five days ago her symptoms worsened. She reports her left lower molar filling came out yesterday which she was eating a waffle. The patient notes that she has attempted to get a dental appointment at the [facility] but they told her she needed a referral.     She reports that she has been taking 2,000mg of tylenol every 4 hours for approximately five days to relieve her dental pain. She has also been using Orajel and Anbesol to relieve her dental pain. She does not take ibuprofen for it upsets her stomach.The patient reports she is also on Subuxone, but has not taken any today, last time was yesterday.The patient has allergies to Compazine, Demerol, and Nsaids.    Past Medical History:   Diagnosis Date     Asthma      Depressive disorder      Seizures (H)     pseudoseizures       Past Surgical History:   Procedure Laterality Date     CHOLECYSTECTOMY       ENT SURGERY  2011       No family history on file.    Social History   Substance Use Topics     Smoking status: Current Every Day Smoker     Packs/day: 0.25     Years: 14.00     Smokeless tobacco: Never Used     Alcohol use No       No current facility-administered medications for this encounter.      Current Outpatient Prescriptions   Medication      Acetaminophen (TYLENOL PO)     buprenorphine-naloxone (SUBOXONE) 8-2 MG SUBL     OMEPRAZOLE PO     penicillin V potassium (VEETID) 500 MG tablet     PROPRANOLOL HCL PO     RisperiDONE (RISPERDAL PO)     sertraline (ZOLOFT) 25 MG tablet     albuterol 108 (90 BASE) MCG/ACT inhaler     oxycodone (ROXICODONE) 5 MG immediate release tablet        Allergies   Allergen Reactions     Compazine      Demerol      Dye [Contrast Dye]      Nsaids          I have reviewed the Medications, Allergies, Past Medical and Surgical History, and Social History in the Epic system.    Review of Systems  ROS: 14 point ROS neg other than the symptoms noted above in the HPI.    Physical Exam   BP: 130/81  Pulse: 103  Temp: 98.6  F (37  C)  Resp: 16  Weight: 69.2 kg (152 lb 9.6 oz)  SpO2: 100 %      Physical Exam  Exam:  Constitutional: healthy, alert and no distress  Head: Normocephalic. No masses, lesions, tenderness or abnormalities  Neck: Neck supple. No adenopathy. Thyroid symmetric, normal size,, Carotids without bruits.  ENT: No evidence of obvious abscess but poor dentition otherwise ENT exam normal, no neck nodes or sinus tenderness      ED Course     ED Course     Procedures               Labs Ordered and Resulted from Time of ED Arrival Up to the Time of Departure from the ED   COMPREHENSIVE METABOLIC PANEL - Abnormal; Notable for the following:        Result Value    Sodium 147 (*)     Chloride 111 (*)     Bilirubin Total 0.1 (*)     All other components within normal limits   ACETAMINOPHEN LEVEL            Assessments & Plan (with Medical Decision Making)   Dental pain for the last few days and unable to get to dentist. No evidence of obvious abscess at this time. However, taking multiple APAP so will check LFT and APAP level. No evidence of elevation of either on lab. Will d/c home with abx and conservative tx.    I have reviewed the nursing notes.    I have reviewed the findings, diagnosis, plan and need for follow up with the  patient.    Discharge Medication List as of 4/20/2018  2:27 PM      START taking these medications    Details   penicillin V potassium (VEETID) 500 MG tablet Take 1 tablet (500 mg) by mouth 4 times daily for 10 days, Disp-40 tablet, R-0, Local Print             Final diagnoses:   Dental caries     Giuliana CAST, am serving as a trained medical scribe to document services personally performed by Abdulkadir Julio MD, based on the provider's statements to me.   Abdulkadir CAST MD, was physically present and have reviewed and verified the accuracy of this note documented by Giuliana Finney.    4/20/2018   [facility], [place], EMERGENCY DEPARTMENT     Abdulkadir Julio MD  04/21/18 1002

## 2018-04-22 ENCOUNTER — COMMUNICATION - HEALTHEAST (OUTPATIENT)
Dept: BEHAVIORAL HEALTH | Facility: CLINIC | Age: 43
End: 2018-04-22

## 2018-04-22 DIAGNOSIS — F13.10 SEDATIVE, HYPNOTIC OR ANXIOLYTIC USE DISORDER, MILD, ABUSE (H): ICD-10-CM

## 2018-04-24 ENCOUNTER — AMBULATORY - HEALTHEAST (OUTPATIENT)
Dept: BEHAVIORAL HEALTH | Facility: CLINIC | Age: 43
End: 2018-04-24

## 2018-04-24 DIAGNOSIS — F11.20 OPIOID USE DISORDER, SEVERE, DEPENDENCE (H): ICD-10-CM

## 2018-04-24 LAB
AMPHETAMINES UR QL SCN: NORMAL
BARBITURATES UR QL: NORMAL
BENZODIAZ UR QL: NORMAL
CANNABINOIDS UR QL SCN: NORMAL
COCAINE UR QL: NORMAL
CREAT UR-MCNC: 276.6 MG/DL
METHADONE UR QL SCN: NORMAL
OPIATES UR QL SCN: NORMAL
OXYCODONE UR QL: NORMAL
PCP UR QL SCN: NORMAL

## 2018-04-24 ASSESSMENT — MIFFLIN-ST. JEOR: SCORE: 1259.17

## 2018-05-08 ENCOUNTER — COMMUNICATION - HEALTHEAST (OUTPATIENT)
Dept: BEHAVIORAL HEALTH | Facility: CLINIC | Age: 43
End: 2018-05-08

## 2018-05-08 DIAGNOSIS — F41.1 GENERALIZED ANXIETY DISORDER: ICD-10-CM

## 2018-05-08 DIAGNOSIS — G25.0 ESSENTIAL TREMOR: ICD-10-CM

## 2018-05-08 DIAGNOSIS — F13.10 SEDATIVE, HYPNOTIC OR ANXIOLYTIC USE DISORDER, MILD, ABUSE (H): ICD-10-CM

## 2018-05-08 DIAGNOSIS — F33.9 DEPRESSION, MAJOR, RECURRENT (H): ICD-10-CM

## 2018-05-15 ENCOUNTER — AMBULATORY - HEALTHEAST (OUTPATIENT)
Dept: LAB | Facility: CLINIC | Age: 43
End: 2018-05-15

## 2018-05-15 ENCOUNTER — COMMUNICATION - HEALTHEAST (OUTPATIENT)
Dept: BEHAVIORAL HEALTH | Facility: CLINIC | Age: 43
End: 2018-05-15

## 2018-05-15 ENCOUNTER — OFFICE VISIT - HEALTHEAST (OUTPATIENT)
Dept: BEHAVIORAL HEALTH | Facility: CLINIC | Age: 43
End: 2018-05-15

## 2018-05-15 DIAGNOSIS — F11.10 OPIOID USE DISORDER, MILD, ABUSE (H): ICD-10-CM

## 2018-05-15 DIAGNOSIS — F11.20 OPIOID USE DISORDER, SEVERE, DEPENDENCE (H): ICD-10-CM

## 2018-05-15 DIAGNOSIS — F41.1 GENERALIZED ANXIETY DISORDER: ICD-10-CM

## 2018-05-15 DIAGNOSIS — F13.10 SEDATIVE, HYPNOTIC OR ANXIOLYTIC USE DISORDER, MILD, ABUSE (H): ICD-10-CM

## 2018-05-15 LAB
AMPHETAMINES UR QL SCN: NORMAL
BARBITURATES UR QL: NORMAL
BENZODIAZ UR QL: NORMAL
BUPRENORPHINE QUAL URINE LHE: ABNORMAL
CANNABINOIDS UR QL SCN: NORMAL
COCAINE UR QL: NORMAL
CREAT UR-MCNC: 231.5 MG/DL
CREAT UR-MCNC: 234.5 MG/DL
METHADONE UR QL SCN: NORMAL
OPIATES UR QL SCN: NORMAL
OXYCODONE UR QL: NORMAL
PCP UR QL SCN: NORMAL

## 2018-05-28 ENCOUNTER — COMMUNICATION - HEALTHEAST (OUTPATIENT)
Dept: BEHAVIORAL HEALTH | Facility: CLINIC | Age: 43
End: 2018-05-28

## 2018-05-28 DIAGNOSIS — F13.10 SEDATIVE, HYPNOTIC OR ANXIOLYTIC USE DISORDER, MILD, ABUSE (H): ICD-10-CM

## 2018-05-28 DIAGNOSIS — F41.1 GENERALIZED ANXIETY DISORDER: ICD-10-CM

## 2018-06-01 ENCOUNTER — COMMUNICATION - HEALTHEAST (OUTPATIENT)
Dept: BEHAVIORAL HEALTH | Facility: CLINIC | Age: 43
End: 2018-06-01

## 2018-06-01 ENCOUNTER — OFFICE VISIT - HEALTHEAST (OUTPATIENT)
Dept: BEHAVIORAL HEALTH | Facility: CLINIC | Age: 43
End: 2018-06-01

## 2018-06-01 DIAGNOSIS — F33.9 MAJOR DEPRESSIVE DISORDER, RECURRENT EPISODE (H): ICD-10-CM

## 2018-06-01 DIAGNOSIS — F13.10 SEDATIVE, HYPNOTIC OR ANXIOLYTIC USE DISORDER, MILD, ABUSE (H): ICD-10-CM

## 2018-06-01 DIAGNOSIS — F33.9 DEPRESSION, MAJOR, RECURRENT (H): ICD-10-CM

## 2018-06-01 DIAGNOSIS — F11.10 OPIOID USE DISORDER, MILD, ABUSE (H): ICD-10-CM

## 2018-06-01 DIAGNOSIS — F11.20 OPIOID USE DISORDER, SEVERE, DEPENDENCE (H): ICD-10-CM

## 2018-06-01 LAB
AMPHETAMINES UR QL SCN: NORMAL
BARBITURATES UR QL: NORMAL
BENZODIAZ UR QL: NORMAL
CANNABINOIDS UR QL SCN: NORMAL
COCAINE UR QL: NORMAL
CREAT UR-MCNC: 8.8 MG/DL
METHADONE UR QL SCN: NORMAL
OPIATES UR QL SCN: NORMAL
OXYCODONE UR QL: NORMAL
PCP UR QL SCN: NORMAL

## 2018-06-01 ASSESSMENT — MIFFLIN-ST. JEOR: SCORE: 1204.74

## 2018-06-12 ENCOUNTER — COMMUNICATION - HEALTHEAST (OUTPATIENT)
Dept: BEHAVIORAL HEALTH | Facility: CLINIC | Age: 43
End: 2018-06-12

## 2018-06-24 ENCOUNTER — COMMUNICATION - HEALTHEAST (OUTPATIENT)
Dept: BEHAVIORAL HEALTH | Facility: CLINIC | Age: 43
End: 2018-06-24

## 2018-06-24 DIAGNOSIS — F41.1 GENERALIZED ANXIETY DISORDER: ICD-10-CM

## 2018-06-24 DIAGNOSIS — F13.10 SEDATIVE, HYPNOTIC OR ANXIOLYTIC USE DISORDER, MILD, ABUSE (H): ICD-10-CM

## 2018-06-25 ENCOUNTER — COMMUNICATION - HEALTHEAST (OUTPATIENT)
Dept: BEHAVIORAL HEALTH | Facility: CLINIC | Age: 43
End: 2018-06-25

## 2018-06-25 DIAGNOSIS — F13.10 SEDATIVE, HYPNOTIC OR ANXIOLYTIC USE DISORDER, MILD, ABUSE (H): ICD-10-CM

## 2018-06-25 DIAGNOSIS — F41.1 GENERALIZED ANXIETY DISORDER: ICD-10-CM

## 2018-07-02 ENCOUNTER — COMMUNICATION - HEALTHEAST (OUTPATIENT)
Dept: BEHAVIORAL HEALTH | Facility: CLINIC | Age: 43
End: 2018-07-02

## 2018-07-02 DIAGNOSIS — F33.9 MAJOR DEPRESSIVE DISORDER, RECURRENT EPISODE (H): ICD-10-CM

## 2018-07-02 DIAGNOSIS — F13.10 SEDATIVE, HYPNOTIC OR ANXIOLYTIC USE DISORDER, MILD, ABUSE (H): ICD-10-CM

## 2018-07-03 ENCOUNTER — COMMUNICATION - HEALTHEAST (OUTPATIENT)
Dept: BEHAVIORAL HEALTH | Facility: CLINIC | Age: 43
End: 2018-07-03

## 2018-07-03 ENCOUNTER — OFFICE VISIT - HEALTHEAST (OUTPATIENT)
Dept: BEHAVIORAL HEALTH | Facility: CLINIC | Age: 43
End: 2018-07-03

## 2018-07-03 DIAGNOSIS — F13.10 SEDATIVE, HYPNOTIC OR ANXIOLYTIC USE DISORDER, MILD, ABUSE (H): ICD-10-CM

## 2018-07-03 DIAGNOSIS — F11.10 OPIOID USE DISORDER, MILD, ABUSE (H): ICD-10-CM

## 2018-07-03 DIAGNOSIS — F41.1 GENERALIZED ANXIETY DISORDER: ICD-10-CM

## 2018-07-03 DIAGNOSIS — F11.20 OPIOID USE DISORDER, SEVERE, DEPENDENCE (H): ICD-10-CM

## 2018-07-03 DIAGNOSIS — F33.42 MAJOR DEPRESSIVE DISORDER, RECURRENT EPISODE, IN FULL REMISSION (H): ICD-10-CM

## 2018-07-03 LAB
AMPHETAMINES UR QL SCN: NORMAL
BARBITURATES UR QL: NORMAL
BENZODIAZ UR QL: NORMAL
CANNABINOIDS UR QL SCN: NORMAL
COCAINE UR QL: NORMAL
CREAT UR-MCNC: 15.2 MG/DL
METHADONE UR QL SCN: NORMAL
OPIATES UR QL SCN: NORMAL
OXYCODONE UR QL: NORMAL
PCP UR QL SCN: NORMAL

## 2018-07-03 ASSESSMENT — MIFFLIN-ST. JEOR: SCORE: 1250.1

## 2018-07-22 ENCOUNTER — COMMUNICATION - HEALTHEAST (OUTPATIENT)
Dept: BEHAVIORAL HEALTH | Facility: CLINIC | Age: 43
End: 2018-07-22

## 2018-07-22 DIAGNOSIS — F13.10 SEDATIVE, HYPNOTIC OR ANXIOLYTIC USE DISORDER, MILD, ABUSE (H): ICD-10-CM

## 2018-07-29 ENCOUNTER — COMMUNICATION - HEALTHEAST (OUTPATIENT)
Dept: BEHAVIORAL HEALTH | Facility: CLINIC | Age: 43
End: 2018-07-29

## 2018-07-29 DIAGNOSIS — F13.10 SEDATIVE, HYPNOTIC OR ANXIOLYTIC USE DISORDER, MILD, ABUSE (H): ICD-10-CM

## 2018-07-29 DIAGNOSIS — F41.1 GENERALIZED ANXIETY DISORDER: ICD-10-CM

## 2018-07-29 DIAGNOSIS — F33.9 DEPRESSION, MAJOR, RECURRENT (H): ICD-10-CM

## 2018-07-29 DIAGNOSIS — F33.42 MAJOR DEPRESSIVE DISORDER, RECURRENT EPISODE, IN FULL REMISSION (H): ICD-10-CM

## 2018-08-06 ENCOUNTER — OFFICE VISIT - HEALTHEAST (OUTPATIENT)
Dept: BEHAVIORAL HEALTH | Facility: CLINIC | Age: 43
End: 2018-08-06

## 2018-08-06 DIAGNOSIS — F41.1 ANXIETY STATE: ICD-10-CM

## 2018-08-06 DIAGNOSIS — F13.10 SEDATIVE, HYPNOTIC OR ANXIOLYTIC USE DISORDER, MILD, ABUSE (H): ICD-10-CM

## 2018-08-06 DIAGNOSIS — F11.10 OPIOID USE DISORDER, MILD, ABUSE (H): ICD-10-CM

## 2018-08-06 DIAGNOSIS — F11.20 OPIOID USE DISORDER, SEVERE, DEPENDENCE (H): ICD-10-CM

## 2018-08-06 LAB
AMPHETAMINES UR QL SCN: NORMAL
BARBITURATES UR QL: NORMAL
BENZODIAZ UR QL: NORMAL
BUPRENORPHINE QUAL URINE LHE: ABNORMAL
CANNABINOIDS UR QL SCN: NORMAL
COCAINE UR QL: NORMAL
CREAT UR-MCNC: 31.1 MG/DL
CREAT UR-MCNC: 32 MG/DL
METHADONE UR QL SCN: NORMAL
OPIATES UR QL SCN: NORMAL
OXYCODONE UR QL: NORMAL
PCP UR QL SCN: NORMAL

## 2018-08-06 ASSESSMENT — MIFFLIN-ST. JEOR: SCORE: 1250.1

## 2018-08-20 ENCOUNTER — COMMUNICATION - HEALTHEAST (OUTPATIENT)
Dept: BEHAVIORAL HEALTH | Facility: CLINIC | Age: 43
End: 2018-08-20

## 2018-08-20 ENCOUNTER — OFFICE VISIT - HEALTHEAST (OUTPATIENT)
Dept: BEHAVIORAL HEALTH | Facility: CLINIC | Age: 43
End: 2018-08-20

## 2018-08-20 DIAGNOSIS — F11.10 OPIOID USE DISORDER, MILD, ABUSE (H): ICD-10-CM

## 2018-08-20 DIAGNOSIS — F13.10 SEDATIVE, HYPNOTIC OR ANXIOLYTIC USE DISORDER, MILD, ABUSE (H): ICD-10-CM

## 2018-08-20 DIAGNOSIS — F41.1 ANXIETY STATE: ICD-10-CM

## 2018-08-20 DIAGNOSIS — F11.20 OPIOID USE DISORDER, SEVERE, DEPENDENCE (H): ICD-10-CM

## 2018-08-20 LAB
AMPHETAMINES UR QL SCN: NORMAL
BARBITURATES UR QL: NORMAL
BENZODIAZ UR QL: NORMAL
CANNABINOIDS UR QL SCN: NORMAL
COCAINE UR QL: NORMAL
CREAT UR-MCNC: 84.9 MG/DL
METHADONE UR QL SCN: NORMAL
OPIATES UR QL SCN: NORMAL
OXYCODONE UR QL: NORMAL
PCP UR QL SCN: NORMAL

## 2018-08-20 ASSESSMENT — MIFFLIN-ST. JEOR: SCORE: 1227.42

## 2018-09-15 ENCOUNTER — COMMUNICATION - HEALTHEAST (OUTPATIENT)
Dept: BEHAVIORAL HEALTH | Facility: CLINIC | Age: 43
End: 2018-09-15

## 2018-09-15 DIAGNOSIS — F33.42 MAJOR DEPRESSIVE DISORDER, RECURRENT EPISODE, IN FULL REMISSION (H): ICD-10-CM

## 2018-09-15 DIAGNOSIS — F13.10 SEDATIVE, HYPNOTIC OR ANXIOLYTIC USE DISORDER, MILD, ABUSE (H): ICD-10-CM

## 2018-09-15 DIAGNOSIS — F41.1 GENERALIZED ANXIETY DISORDER: ICD-10-CM

## 2018-09-15 DIAGNOSIS — F33.9 DEPRESSION, MAJOR, RECURRENT (H): ICD-10-CM

## 2018-09-17 ENCOUNTER — COMMUNICATION - HEALTHEAST (OUTPATIENT)
Dept: BEHAVIORAL HEALTH | Facility: CLINIC | Age: 43
End: 2018-09-17

## 2018-09-17 DIAGNOSIS — F41.1 GENERALIZED ANXIETY DISORDER: ICD-10-CM

## 2018-09-17 DIAGNOSIS — F33.9 DEPRESSION, MAJOR, RECURRENT (H): ICD-10-CM

## 2018-09-17 DIAGNOSIS — F13.10 SEDATIVE, HYPNOTIC OR ANXIOLYTIC USE DISORDER, MILD, ABUSE (H): ICD-10-CM

## 2018-09-17 DIAGNOSIS — F33.42 MAJOR DEPRESSIVE DISORDER, RECURRENT EPISODE, IN FULL REMISSION (H): ICD-10-CM

## 2018-09-22 ENCOUNTER — COMMUNICATION - HEALTHEAST (OUTPATIENT)
Dept: BEHAVIORAL HEALTH | Facility: CLINIC | Age: 43
End: 2018-09-22

## 2018-09-22 DIAGNOSIS — F13.10 SEDATIVE, HYPNOTIC OR ANXIOLYTIC USE DISORDER, MILD, ABUSE (H): ICD-10-CM

## 2018-09-22 DIAGNOSIS — F41.1 ANXIETY STATE: ICD-10-CM

## 2018-09-24 ENCOUNTER — OFFICE VISIT - HEALTHEAST (OUTPATIENT)
Dept: BEHAVIORAL HEALTH | Facility: CLINIC | Age: 43
End: 2018-09-24

## 2018-09-24 DIAGNOSIS — F11.10 OPIOID USE DISORDER, MILD, ABUSE (H): ICD-10-CM

## 2018-09-24 DIAGNOSIS — R00.0 TACHYCARDIA: ICD-10-CM

## 2018-09-24 DIAGNOSIS — F41.1 ANXIETY STATE: ICD-10-CM

## 2018-09-24 DIAGNOSIS — F11.20 OPIOID USE DISORDER, SEVERE, DEPENDENCE (H): ICD-10-CM

## 2018-09-24 DIAGNOSIS — F13.10 SEDATIVE, HYPNOTIC OR ANXIOLYTIC USE DISORDER, MILD, ABUSE (H): ICD-10-CM

## 2018-09-24 LAB
AMPHETAMINES UR QL SCN: NORMAL
BARBITURATES UR QL: NORMAL
BENZODIAZ UR QL: NORMAL
BUPRENORPHINE QUAL URINE LHE: ABNORMAL
CANNABINOIDS UR QL SCN: NORMAL
COCAINE UR QL: NORMAL
CREAT UR-MCNC: 13.2 MG/DL
CREAT UR-MCNC: 13.4 MG/DL
METHADONE UR QL SCN: NORMAL
OPIATES UR QL SCN: NORMAL
OXYCODONE UR QL: NORMAL
PCP UR QL SCN: NORMAL

## 2018-09-24 ASSESSMENT — MIFFLIN-ST. JEOR: SCORE: 1200.2

## 2018-09-27 LAB
ETHYL GLUCURONIDE UR CFM-MCNC: <100 NG/ML
ETHYL SULFATE UR CFM-MCNC: <100 NG/ML

## 2018-10-23 ENCOUNTER — COMMUNICATION - HEALTHEAST (OUTPATIENT)
Dept: BEHAVIORAL HEALTH | Facility: CLINIC | Age: 43
End: 2018-10-23

## 2018-10-23 ENCOUNTER — OFFICE VISIT - HEALTHEAST (OUTPATIENT)
Dept: BEHAVIORAL HEALTH | Facility: CLINIC | Age: 43
End: 2018-10-23

## 2018-10-23 DIAGNOSIS — F11.10 OPIOID USE DISORDER, MILD, ABUSE (H): ICD-10-CM

## 2018-10-23 DIAGNOSIS — F13.10 SEDATIVE, HYPNOTIC OR ANXIOLYTIC USE DISORDER, MILD, ABUSE (H): ICD-10-CM

## 2018-10-23 DIAGNOSIS — F11.20 OPIOID USE DISORDER, SEVERE, DEPENDENCE (H): ICD-10-CM

## 2018-10-23 DIAGNOSIS — F33.9 DEPRESSION, MAJOR, RECURRENT (H): ICD-10-CM

## 2018-10-23 DIAGNOSIS — F41.1 GENERALIZED ANXIETY DISORDER: ICD-10-CM

## 2018-10-23 DIAGNOSIS — F41.1 ANXIETY STATE: ICD-10-CM

## 2018-10-23 DIAGNOSIS — F33.42 MAJOR DEPRESSIVE DISORDER, RECURRENT EPISODE, IN FULL REMISSION (H): ICD-10-CM

## 2018-10-23 LAB
AMPHETAMINES UR QL SCN: NORMAL
BARBITURATES UR QL: NORMAL
BENZODIAZ UR QL: NORMAL
CANNABINOIDS UR QL SCN: NORMAL
COCAINE UR QL: NORMAL
CREAT UR-MCNC: 13.1 MG/DL
METHADONE UR QL SCN: NORMAL
OPIATES UR QL SCN: NORMAL
OXYCODONE UR QL: NORMAL
PCP UR QL SCN: NORMAL

## 2018-10-23 ASSESSMENT — MIFFLIN-ST. JEOR: SCORE: 1200.2

## 2018-11-23 ENCOUNTER — COMMUNICATION - HEALTHEAST (OUTPATIENT)
Dept: BEHAVIORAL HEALTH | Facility: CLINIC | Age: 43
End: 2018-11-23

## 2018-11-23 DIAGNOSIS — F11.20 OPIOID USE DISORDER, SEVERE, DEPENDENCE (H): ICD-10-CM

## 2018-12-18 ENCOUNTER — OFFICE VISIT - HEALTHEAST (OUTPATIENT)
Dept: BEHAVIORAL HEALTH | Facility: CLINIC | Age: 43
End: 2018-12-18

## 2018-12-18 DIAGNOSIS — F33.9 DEPRESSION, MAJOR, RECURRENT (H): ICD-10-CM

## 2018-12-18 DIAGNOSIS — F13.10 SEDATIVE, HYPNOTIC OR ANXIOLYTIC USE DISORDER, MILD, ABUSE (H): ICD-10-CM

## 2018-12-18 DIAGNOSIS — F10.20 SEVERE ALCOHOL USE DISORDER (H): ICD-10-CM

## 2018-12-18 DIAGNOSIS — F41.1 GENERALIZED ANXIETY DISORDER: ICD-10-CM

## 2018-12-18 DIAGNOSIS — F11.10 OPIOID USE DISORDER, MILD, ABUSE (H): ICD-10-CM

## 2018-12-18 DIAGNOSIS — F11.21 OPIOID USE DISORDER, SEVERE, IN SUSTAINED REMISSION, ON MAINTENANCE THERAPY (H): ICD-10-CM

## 2018-12-18 DIAGNOSIS — F33.42 MAJOR DEPRESSIVE DISORDER, RECURRENT EPISODE, IN FULL REMISSION (H): ICD-10-CM

## 2018-12-18 DIAGNOSIS — F11.20 OPIOID USE DISORDER, SEVERE, DEPENDENCE (H): ICD-10-CM

## 2018-12-18 DIAGNOSIS — G25.0 ESSENTIAL TREMOR: ICD-10-CM

## 2018-12-18 LAB
AMPHETAMINES UR QL SCN: NORMAL
BARBITURATES UR QL: NORMAL
BENZODIAZ UR QL: NORMAL
BUPRENORPHINE QUAL URINE LHE: ABNORMAL
CANNABINOIDS UR QL SCN: NORMAL
COCAINE UR QL: NORMAL
CREAT UR-MCNC: 75.1 MG/DL
CREAT UR-MCNC: 75.5 MG/DL
METHADONE UR QL SCN: NORMAL
OPIATES UR QL SCN: NORMAL
OXYCODONE UR QL: NORMAL
PCP UR QL SCN: NORMAL

## 2018-12-18 ASSESSMENT — MIFFLIN-ST. JEOR: SCORE: 1245.56

## 2018-12-21 LAB
ETHYL GLUCURONIDE UR CFM-MCNC: <100 NG/ML
ETHYL SULFATE UR CFM-MCNC: <100 NG/ML

## 2019-02-06 ENCOUNTER — COMMUNICATION - HEALTHEAST (OUTPATIENT)
Dept: BEHAVIORAL HEALTH | Facility: CLINIC | Age: 44
End: 2019-02-06

## 2019-02-13 ENCOUNTER — COMMUNICATION - HEALTHEAST (OUTPATIENT)
Dept: BEHAVIORAL HEALTH | Facility: CLINIC | Age: 44
End: 2019-02-13

## 2019-02-13 DIAGNOSIS — G25.0 ESSENTIAL TREMOR: ICD-10-CM

## 2019-02-13 DIAGNOSIS — F41.1 GENERALIZED ANXIETY DISORDER: ICD-10-CM

## 2019-02-14 ENCOUNTER — COMMUNICATION - HEALTHEAST (OUTPATIENT)
Dept: BEHAVIORAL HEALTH | Facility: CLINIC | Age: 44
End: 2019-02-14

## 2019-02-14 DIAGNOSIS — F11.21 OPIOID USE DISORDER, SEVERE, IN SUSTAINED REMISSION, ON MAINTENANCE THERAPY (H): ICD-10-CM

## 2019-03-15 ENCOUNTER — OFFICE VISIT - HEALTHEAST (OUTPATIENT)
Dept: BEHAVIORAL HEALTH | Facility: CLINIC | Age: 44
End: 2019-03-15

## 2019-03-15 DIAGNOSIS — F11.20 OPIOID USE DISORDER, SEVERE, DEPENDENCE (H): ICD-10-CM

## 2019-03-15 DIAGNOSIS — F11.21 OPIOID USE DISORDER, SEVERE, IN SUSTAINED REMISSION, ON MAINTENANCE THERAPY (H): ICD-10-CM

## 2019-03-15 DIAGNOSIS — F13.10 SEDATIVE, HYPNOTIC OR ANXIOLYTIC USE DISORDER, MILD, ABUSE (H): ICD-10-CM

## 2019-03-15 DIAGNOSIS — F33.9 DEPRESSION, MAJOR, RECURRENT (H): ICD-10-CM

## 2019-03-15 DIAGNOSIS — F41.1 ANXIETY STATE: ICD-10-CM

## 2019-03-15 DIAGNOSIS — F33.42 MAJOR DEPRESSIVE DISORDER, RECURRENT EPISODE, IN FULL REMISSION (H): ICD-10-CM

## 2019-03-15 DIAGNOSIS — F41.1 GENERALIZED ANXIETY DISORDER: ICD-10-CM

## 2019-03-15 DIAGNOSIS — F11.10 OPIOID USE DISORDER, MILD, ABUSE (H): ICD-10-CM

## 2019-03-15 LAB
AMPHETAMINES UR QL SCN: NORMAL
BARBITURATES UR QL: NORMAL
BENZODIAZ UR QL: NORMAL
BUPRENORPHINE QUAL URINE LHE: ABNORMAL
CANNABINOIDS UR QL SCN: NORMAL
COCAINE UR QL: NORMAL
CREAT UR-MCNC: 297.4 MG/DL
CREAT UR-MCNC: 312.7 MG/DL
METHADONE UR QL SCN: NORMAL
OPIATES UR QL SCN: NORMAL
OXYCODONE UR QL: NORMAL
PCP UR QL SCN: NORMAL

## 2019-03-15 ASSESSMENT — MIFFLIN-ST. JEOR: SCORE: 1263.7

## 2019-05-16 ENCOUNTER — OFFICE VISIT - HEALTHEAST (OUTPATIENT)
Dept: BEHAVIORAL HEALTH | Facility: CLINIC | Age: 44
End: 2019-05-16

## 2019-05-16 ENCOUNTER — COMMUNICATION - HEALTHEAST (OUTPATIENT)
Dept: BEHAVIORAL HEALTH | Facility: CLINIC | Age: 44
End: 2019-05-16

## 2019-05-16 DIAGNOSIS — F41.1 GENERALIZED ANXIETY DISORDER: ICD-10-CM

## 2019-05-16 DIAGNOSIS — F33.9 DEPRESSION, MAJOR, RECURRENT (H): ICD-10-CM

## 2019-05-16 DIAGNOSIS — F33.42 MAJOR DEPRESSIVE DISORDER, RECURRENT EPISODE, IN FULL REMISSION (H): ICD-10-CM

## 2019-05-16 DIAGNOSIS — F13.10 SEDATIVE, HYPNOTIC OR ANXIOLYTIC USE DISORDER, MILD, ABUSE (H): ICD-10-CM

## 2019-05-16 DIAGNOSIS — F11.21 OPIOID USE DISORDER, SEVERE, IN SUSTAINED REMISSION, ON MAINTENANCE THERAPY (H): ICD-10-CM

## 2019-05-16 DIAGNOSIS — G25.0 ESSENTIAL TREMOR: ICD-10-CM

## 2019-05-16 LAB
AMPHETAMINES UR QL SCN: NORMAL
BARBITURATES UR QL: NORMAL
BENZODIAZ UR QL: NORMAL
CANNABINOIDS UR QL SCN: NORMAL
COCAINE UR QL: NORMAL
CREAT UR-MCNC: 108.1 MG/DL
METHADONE UR QL SCN: NORMAL
OPIATES UR QL SCN: NORMAL
OXYCODONE UR QL: NORMAL
PCP UR QL SCN: NORMAL

## 2019-05-16 ASSESSMENT — MIFFLIN-ST. JEOR: SCORE: 1299.99

## 2019-05-24 ENCOUNTER — COMMUNICATION - HEALTHEAST (OUTPATIENT)
Dept: BEHAVIORAL HEALTH | Facility: CLINIC | Age: 44
End: 2019-05-24

## 2019-05-24 DIAGNOSIS — G25.0 ESSENTIAL TREMOR: ICD-10-CM

## 2019-05-24 DIAGNOSIS — F41.1 GENERALIZED ANXIETY DISORDER: ICD-10-CM

## 2019-06-16 ENCOUNTER — COMMUNICATION - HEALTHEAST (OUTPATIENT)
Dept: BEHAVIORAL HEALTH | Facility: CLINIC | Age: 44
End: 2019-06-16

## 2019-06-16 DIAGNOSIS — F13.10 SEDATIVE, HYPNOTIC OR ANXIOLYTIC USE DISORDER, MILD, ABUSE (H): ICD-10-CM

## 2019-06-17 ENCOUNTER — COMMUNICATION - HEALTHEAST (OUTPATIENT)
Dept: BEHAVIORAL HEALTH | Facility: CLINIC | Age: 44
End: 2019-06-17

## 2019-07-01 ENCOUNTER — COMMUNICATION - HEALTHEAST (OUTPATIENT)
Dept: BEHAVIORAL HEALTH | Facility: CLINIC | Age: 44
End: 2019-07-01

## 2019-07-08 ENCOUNTER — COMMUNICATION - HEALTHEAST (OUTPATIENT)
Dept: BEHAVIORAL HEALTH | Facility: CLINIC | Age: 44
End: 2019-07-08

## 2019-07-08 DIAGNOSIS — F13.10 SEDATIVE, HYPNOTIC OR ANXIOLYTIC USE DISORDER, MILD, ABUSE (H): ICD-10-CM

## 2019-07-11 ENCOUNTER — COMMUNICATION - HEALTHEAST (OUTPATIENT)
Dept: BEHAVIORAL HEALTH | Facility: CLINIC | Age: 44
End: 2019-07-11

## 2019-07-11 DIAGNOSIS — F13.10 SEDATIVE, HYPNOTIC OR ANXIOLYTIC USE DISORDER, MILD, ABUSE (H): ICD-10-CM

## 2019-08-05 ENCOUNTER — COMMUNICATION - HEALTHEAST (OUTPATIENT)
Dept: BEHAVIORAL HEALTH | Facility: CLINIC | Age: 44
End: 2019-08-05

## 2019-08-05 DIAGNOSIS — F41.1 GENERALIZED ANXIETY DISORDER: ICD-10-CM

## 2019-08-05 DIAGNOSIS — G25.0 ESSENTIAL TREMOR: ICD-10-CM

## 2019-08-11 ENCOUNTER — COMMUNICATION - HEALTHEAST (OUTPATIENT)
Dept: BEHAVIORAL HEALTH | Facility: CLINIC | Age: 44
End: 2019-08-11

## 2019-08-11 DIAGNOSIS — F41.1 ANXIETY STATE: ICD-10-CM

## 2019-08-11 DIAGNOSIS — F13.10 SEDATIVE, HYPNOTIC OR ANXIOLYTIC USE DISORDER, MILD, ABUSE (H): ICD-10-CM

## 2019-08-15 ENCOUNTER — OFFICE VISIT - HEALTHEAST (OUTPATIENT)
Dept: BEHAVIORAL HEALTH | Facility: CLINIC | Age: 44
End: 2019-08-15

## 2019-08-15 DIAGNOSIS — F11.21 OPIOID USE DISORDER, SEVERE, IN SUSTAINED REMISSION, ON MAINTENANCE THERAPY (H): ICD-10-CM

## 2019-08-15 DIAGNOSIS — F10.20 SEVERE ALCOHOL USE DISORDER (H): ICD-10-CM

## 2019-08-15 LAB
AMPHETAMINES UR QL SCN: NORMAL
BARBITURATES UR QL: NORMAL
BENZODIAZ UR QL: NORMAL
BUPRENORPHINE QUAL URINE LHE: ABNORMAL
CANNABINOIDS UR QL SCN: NORMAL
COCAINE UR QL: NORMAL
CREAT UR-MCNC: 19.1 MG/DL
CREAT UR-MCNC: 19.2 MG/DL
METHADONE UR QL SCN: NORMAL
OPIATES UR QL SCN: NORMAL
OXYCODONE UR QL: NORMAL
PCP UR QL SCN: NORMAL

## 2019-08-15 ASSESSMENT — MIFFLIN-ST. JEOR: SCORE: 1345.35

## 2019-08-17 LAB
ETHYL GLUCURONIDE UR CFM-MCNC: <100 NG/ML
ETHYL SULFATE UR CFM-MCNC: <100 NG/ML

## 2019-10-18 ENCOUNTER — OFFICE VISIT - HEALTHEAST (OUTPATIENT)
Dept: BEHAVIORAL HEALTH | Facility: CLINIC | Age: 44
End: 2019-10-18

## 2019-10-18 DIAGNOSIS — F11.21 OPIOID USE DISORDER, SEVERE, IN SUSTAINED REMISSION, ON MAINTENANCE THERAPY (H): ICD-10-CM

## 2019-10-18 DIAGNOSIS — F17.200 NICOTINE USE DISORDER: ICD-10-CM

## 2019-10-18 LAB
AMPHETAMINES UR QL SCN: NORMAL
BARBITURATES UR QL: NORMAL
BENZODIAZ UR QL: NORMAL
CANNABINOIDS UR QL SCN: NORMAL
COCAINE UR QL: NORMAL
CREAT UR-MCNC: 179.9 MG/DL
METHADONE UR QL SCN: NORMAL
OPIATES UR QL SCN: NORMAL
OXYCODONE UR QL: NORMAL
PCP UR QL SCN: NORMAL

## 2019-10-18 ASSESSMENT — ANXIETY QUESTIONNAIRES
2. NOT BEING ABLE TO STOP OR CONTROL WORRYING: NEARLY EVERY DAY
7. FEELING AFRAID AS IF SOMETHING AWFUL MIGHT HAPPEN: NEARLY EVERY DAY
4. TROUBLE RELAXING: NEARLY EVERY DAY
6. BECOMING EASILY ANNOYED OR IRRITABLE: MORE THAN HALF THE DAYS
3. WORRYING TOO MUCH ABOUT DIFFERENT THINGS: NEARLY EVERY DAY
1. FEELING NERVOUS, ANXIOUS, OR ON EDGE: NEARLY EVERY DAY
5. BEING SO RESTLESS THAT IT IS HARD TO SIT STILL: SEVERAL DAYS
GAD7 TOTAL SCORE: 18

## 2019-10-18 ASSESSMENT — PATIENT HEALTH QUESTIONNAIRE - PHQ9: SUM OF ALL RESPONSES TO PHQ QUESTIONS 1-9: 18

## 2019-10-18 ASSESSMENT — MIFFLIN-ST. JEOR: SCORE: 1327.21

## 2019-11-19 ENCOUNTER — COMMUNICATION - HEALTHEAST (OUTPATIENT)
Dept: BEHAVIORAL HEALTH | Facility: CLINIC | Age: 44
End: 2019-11-19

## 2019-11-19 DIAGNOSIS — F41.1 ANXIETY STATE: ICD-10-CM

## 2019-11-19 DIAGNOSIS — F13.10 SEDATIVE, HYPNOTIC OR ANXIOLYTIC USE DISORDER, MILD, ABUSE (H): ICD-10-CM

## 2019-12-13 ENCOUNTER — OFFICE VISIT - HEALTHEAST (OUTPATIENT)
Dept: BEHAVIORAL HEALTH | Facility: CLINIC | Age: 44
End: 2019-12-13

## 2019-12-13 DIAGNOSIS — F41.1 ANXIETY STATE: ICD-10-CM

## 2019-12-13 DIAGNOSIS — F11.21 OPIOID USE DISORDER, SEVERE, IN SUSTAINED REMISSION, ON MAINTENANCE THERAPY (H): ICD-10-CM

## 2019-12-13 DIAGNOSIS — F10.20 SEVERE ALCOHOL USE DISORDER (H): ICD-10-CM

## 2019-12-13 DIAGNOSIS — F13.10 SEDATIVE, HYPNOTIC OR ANXIOLYTIC USE DISORDER, MILD, ABUSE (H): ICD-10-CM

## 2019-12-13 LAB
AMPHETAMINES UR QL SCN: ABNORMAL
BARBITURATES UR QL: ABNORMAL
BENZODIAZ UR QL: ABNORMAL
BUPRENORPHINE QUAL URINE LHE: ABNORMAL
CANNABINOIDS UR QL SCN: ABNORMAL
COCAINE UR QL: ABNORMAL
CREAT UR-MCNC: 161.2 MG/DL
CREAT UR-MCNC: 166.7 MG/DL
METHADONE UR QL SCN: ABNORMAL
OPIATES UR QL SCN: ABNORMAL
OXYCODONE UR QL: ABNORMAL
PCP UR QL SCN: ABNORMAL

## 2019-12-13 ASSESSMENT — ANXIETY QUESTIONNAIRES
7. FEELING AFRAID AS IF SOMETHING AWFUL MIGHT HAPPEN: NEARLY EVERY DAY
4. TROUBLE RELAXING: NEARLY EVERY DAY
2. NOT BEING ABLE TO STOP OR CONTROL WORRYING: NEARLY EVERY DAY
1. FEELING NERVOUS, ANXIOUS, OR ON EDGE: NEARLY EVERY DAY
5. BEING SO RESTLESS THAT IT IS HARD TO SIT STILL: MORE THAN HALF THE DAYS
3. WORRYING TOO MUCH ABOUT DIFFERENT THINGS: NEARLY EVERY DAY
GAD7 TOTAL SCORE: 19
6. BECOMING EASILY ANNOYED OR IRRITABLE: MORE THAN HALF THE DAYS

## 2019-12-13 ASSESSMENT — PATIENT HEALTH QUESTIONNAIRE - PHQ9: SUM OF ALL RESPONSES TO PHQ QUESTIONS 1-9: 25

## 2019-12-13 ASSESSMENT — MIFFLIN-ST. JEOR: SCORE: 1395.25

## 2019-12-16 LAB
ETHYL GLUCURONIDE UR CFM-MCNC: <100 NG/ML
ETHYL SULFATE UR CFM-MCNC: <100 NG/ML

## 2020-01-06 ENCOUNTER — COMMUNICATION - HEALTHEAST (OUTPATIENT)
Dept: BEHAVIORAL HEALTH | Facility: CLINIC | Age: 45
End: 2020-01-06

## 2020-01-06 DIAGNOSIS — F11.21 OPIOID USE DISORDER, SEVERE, IN SUSTAINED REMISSION, ON MAINTENANCE THERAPY (H): ICD-10-CM

## 2021-05-26 ASSESSMENT — PATIENT HEALTH QUESTIONNAIRE - PHQ9
SUM OF ALL RESPONSES TO PHQ QUESTIONS 1-9: 18
SUM OF ALL RESPONSES TO PHQ QUESTIONS 1-9: 25

## 2021-05-28 ASSESSMENT — ANXIETY QUESTIONNAIRES
GAD7 TOTAL SCORE: 18
GAD7 TOTAL SCORE: 19

## 2021-05-28 NOTE — PROGRESS NOTES
Correct pharmacy verified with patient and confirmed in snapshot? [x] yes []no    Charge captured ? [x] yes  [] no    Medications Phoned  to Pharmacy [] yes [x]no  Name of Pharmacist:  List Medications, including dose, quantity and instructions      Medication Prescriptions given to patient   [] yes  [x] no   List the name of the drug the prescription was written for.       Medications ordered this visit were e-scribed.  Verified by order class [x] yes  [] no  Abilify 10 mg  Suboxone 8-2 mg  Gabapentin 300 mg  Zoloft 100 mg    Medication changes or discontinuations were communicated to patient's pharmacy: [] yes  [x] no    UA collected [x] yes  [] no    [place] Prescription Monitoring Program Reviewed? [x] yes  [] no    Referrals were made to: none     Future appointment was made: [x] yes  [] no    Dictation completed at time of chart check: [] yes  [x] no    I have checked the documentation for today s encounters and the above information has been reviewed and completed.

## 2021-05-29 NOTE — TELEPHONE ENCOUNTER
Date of Last Office Visit: 5/16/19  Date of Next Office Visit: 8/15/19  No shows since last visit: no  Cancellations since last visit: no  ED visits since last visit: no    Medication Inderal 20 date last ordered: 2/13/19  Qty: 60  Refills: 1     Disp Refills Start End    propranolol (INDERAL) 20 MG tablet 60 tablet 1 2/13/2019     Sig: TAKE 1 TABLET BY MOUTH TWICE A DAY        Lapse in therapy greater than 7 days: appears yes, according to pharmacy last filled on 4/15/19  Medication refill request verified as identical to current order: yes  Result of Last DAM, VPA, Li+ Level, CBC, or Carbamazepine Level (at or since last visit): Negative DAM on 5/16/19        []Eligibility - not seen in last year    []Supervision - no future appointment    []Compliance     []Verification - order discrepancy    []Controlled Medication    []90 - day supply request    [x]Other  Propranolol RF was refused on 5/16/19 for unknown reason ( pt was seen the same day)   LPN pending medications    Current Medication list:       albuterol (PROVENTIL HFA;VENTOLIN HFA) 90 mcg/actuation inhaler Inhale 2 puffs every 4 (four) hours as needed for wheezing.   ARIPiprazole (ABILIFY) 10 MG tablet Take 1 tablet (10 mg total) by mouth daily.   baclofen (LIORESAL) 20 MG tablet Take 1 tablet (20 mg total) by mouth 3 (three) times a day.   buprenorphine-naloxone (SUBOXONE) 8-2 mg Film per sublingual film Place 1 Film under the tongue 2 (two) times a day.   gabapentin (NEURONTIN) 300 MG capsule Take 2 capsules (600 mg total) by mouth 3 (three) times a day.   hydrOXYzine HCl (ATARAX) 50 MG tablet TAKE 1 TABLET (50 MG TOTAL) BY MOUTH EVERY 6 (SIX) HOURS AS NEEDED FOR ANXIETY.   nicotine polacrilex (COMMIT) 2 MG lozenge Apply 1 lozenge (2 mg total) to the mouth or throat as needed for smoking cessation.   propranolol (INDERAL) 20 MG tablet TAKE 1 TABLET BY MOUTH TWICE A DAY   sertraline (ZOLOFT) 100 MG tablet Take 2 tablets (200 mg total) by mouth daily.          Medication Plan of Care at last office visit with MD/CNP:    PLAN:  5/16/19 dictation not completed    [place], [place], [place], and [place] : NA

## 2021-05-29 NOTE — TELEPHONE ENCOUNTER
Forms (medical opinion form) needs to be completed by dr.Brunner. Please complete and fax back to ATTN:Erin Wong at [facility].  Forms labeled and placed in Dr. Brunner Mail box.

## 2021-05-30 VITALS — WEIGHT: 167 LBS | HEIGHT: 60 IN | BODY MASS INDEX: 32.79 KG/M2

## 2021-05-30 VITALS — HEIGHT: 60 IN | BODY MASS INDEX: 29.64 KG/M2 | WEIGHT: 151 LBS

## 2021-05-30 VITALS — WEIGHT: 153 LBS | HEIGHT: 60 IN | BODY MASS INDEX: 30.04 KG/M2

## 2021-05-30 VITALS — BODY MASS INDEX: 32.2 KG/M2 | WEIGHT: 164 LBS | HEIGHT: 60 IN

## 2021-05-30 VITALS — WEIGHT: 163 LBS | BODY MASS INDEX: 32 KG/M2 | HEIGHT: 60 IN

## 2021-05-30 NOTE — TELEPHONE ENCOUNTER
Dr. Lauren covering for Dr. Brunner    Date of Last Office Visit: 5/16/19  Date of Next Office Visit: 8/15/19  No shows since last visit: no  Cancellations since last visit: no  ED visits since last visit: no    Medication Atarax 50 date last ordered: 3/15/19  Qty: 90  Refills: 3  hydrOXYzine HCl (ATARAX) 50 MG tablet 90 tablet 3 3/15/2019  No   Sig: TAKE 1 TABLET (50 MG TOTAL) BY MOUTH EVERY 6 (SIX) HOURS AS NEEDED FOR ANXIETY.       Lapse in therapy greater than 7 days: no, PRN med  Medication refill request verified as identical to current order: yes  Result of Last DAM, VPA, Li+ Level, CBC, or Carbamazepine Level (at or since last visit): Negative DAM on 5/16/19 and positive BUP on 3/15/19        []Eligibility - not seen in last year    []Supervision - no future appointment    []Compliance     []Verification - order discrepancy    []Controlled Medication    []90 - day supply request    [x]Other LPN pending medications    Current Medication list:      albuterol (PROVENTIL HFA;VENTOLIN HFA) 90 mcg/actuation inhaler Inhale 2 puffs every 4 (four) hours as needed for wheezing.   ARIPiprazole (ABILIFY) 10 MG tablet Take 1 tablet (10 mg total) by mouth daily.   baclofen (LIORESAL) 20 MG tablet Take 1 tablet (20 mg total) by mouth 3 (three) times a day.   buprenorphine-naloxone (SUBOXONE) 8-2 mg Film per sublingual film Place 1 Film under the tongue 2 (two) times a day.   gabapentin (NEURONTIN) 300 MG capsule Take 2 capsules (600 mg total) by mouth 3 (three) times a day.   hydrOXYzine HCl (ATARAX) 50 MG tablet TAKE 1 TABLET (50 MG TOTAL) BY MOUTH EVERY 6 (SIX) HOURS AS NEEDED FOR ANXIETY.   nicotine polacrilex (COMMIT) 2 MG lozenge Apply 1 lozenge (2 mg total) to the mouth or throat as needed for smoking cessation.   propranolol (INDERAL) 20 MG tablet Take 1 tablet (20 mg total) by mouth 2 (two) times a day.   sertraline (ZOLOFT) 100 MG tablet Take 2 tablets (200 mg total) by mouth daily.       Medication Plan of Care  "at last office visit with MD/CNP:    PLAN:  Diagnoses/Plan:  1. Patient to continue on her current suboxone dose of 8/2 mg two times a day. She was given a 30-day supply of this medication today with 2 refills.    2. Patient to continue on baclofen 20 mg three times a day for sedative cravings. This is an \"off-label\" use of this medication.     3. Continue on current psychiatric medications including gabapentin, abilify, zoloft and trazodone. Patient has been referred to psychiatry and psychology, and has had trouble finding follow-up she is comfortable with. I recommended she contact the insurance company she uses and ask them to refer an in-network psychiatrist in [place] as well as a therapist. Patient strongly recommended to start individual psychotherapy, and plans to do so. At this time I will prescribe psychiatric medications, but this is temporary and she will need to obtain a psychiatrist in the future.   4. Patient recommended to obtain a primary care provider in the [place] area.   5. Patient applauded for her attendance at [employer] and strongly encouraged her to try attending meetings in person. Continued to recommend that she do individual therapy to help with anxiety and then try attending meetings in person as able.   6. Patient advised to quit smoking and at this time reports being precontemplative.   7. Urine toxicology with a negative DAM  8. Patient to rtc in 3 months and sooner prn.     [place], [place], [place], and [place] : NA  "

## 2021-05-30 NOTE — TELEPHONE ENCOUNTER
Dr. Lauren covering for Dr. Brunner    Date of Last Office Visit: 5/16/19  Date of Next Office Visit: 8/15/19  No shows since last visit: no  Cancellations since last visit: no  ED visits since last visit: no    Medication Atarax 50 mg date last ordered: 3/15/19  Qty: 90  Refills: 3    hydrOXYzine HCl (ATARAX) 50 MG tablet 90 tablet 3 3/15/2019     Sig: TAKE 1 TABLET (50 MG TOTAL) BY MOUTH EVERY 6 (SIX) HOURS AS NEEDED FOR ANXIETY.        Lapse in therapy greater than 7 days: no  Medication refill request verified as identical to current order: yes  Result of Last DAM, VPA, Li+ Level, CBC, or Carbamazepine Level (at or since last visit):   Negative DAM on 5/16/19        []Eligibility - not seen in last year    []Supervision - no future appointment    []Compliance     []Verification - order discrepancy    []Controlled Medication    []90 - day supply request    [x]Other LPN pending medications    Current Medication list:    albuterol (PROVENTIL HFA;VENTOLIN HFA) 90 mcg/actuation inhaler Inhale 2 puffs every 4 (four) hours as needed for wheezing.   ARIPiprazole (ABILIFY) 10 MG tablet Take 1 tablet (10 mg total) by mouth daily.   baclofen (LIORESAL) 20 MG tablet Take 1 tablet (20 mg total) by mouth 3 (three) times a day.   buprenorphine-naloxone (SUBOXONE) 8-2 mg Film per sublingual film Place 1 Film under the tongue 2 (two) times a day.   gabapentin (NEURONTIN) 300 MG capsule Take 2 capsules (600 mg total) by mouth 3 (three) times a day.   hydrOXYzine HCl (ATARAX) 50 MG tablet TAKE 1 TABLET (50 MG TOTAL) BY MOUTH EVERY 6 (SIX) HOURS AS NEEDED FOR ANXIETY.   nicotine polacrilex (COMMIT) 2 MG lozenge Apply 1 lozenge (2 mg total) to the mouth or throat as needed for smoking cessation.   propranolol (INDERAL) 20 MG tablet Take 1 tablet (20 mg total) by mouth 2 (two) times a day.   sertraline (ZOLOFT) 100 MG tablet Take 2 tablets (200 mg total) by mouth daily.         Medication Plan of Care at last office visit with  "MD/CNP:    PLAN:    Diagnoses/Plan:  1. Patient to continue on her current suboxone dose of 8/2 mg two times a day. She was given a 30-day supply of this medication today with 2 refills.    2. Patient to continue on baclofen 20 mg three times a day for sedative cravings. This is an \"off-label\" use of this medication.     3. Continue on current psychiatric medications including gabapentin, abilify, zoloft and trazodone. Patient has been referred to psychiatry and psychology, and has had trouble finding follow-up she is comfortable with. I recommended she contact the insurance company she uses and ask them to refer an in-network psychiatrist in [place] as well as a therapist. Patient strongly recommended to start individual psychotherapy, and plans to do so. At this time I will prescribe psychiatric medications, but this is temporary and she will need to obtain a psychiatrist in the future.   4. Patient recommended to obtain a primary care provider in the [place] area.   5. Patient applauded for her attendance at [facility] and strongly encouraged her to try attending meetings in person. Continued to recommend that she do individual therapy to help with anxiety and then try attending meetings in person as able.   6. Patient advised to quit smoking and at this time reports being precontemplative.   7. Urine toxicology with a negative DAM  8. Patient to rtc in 3 months and sooner prn.     [place], [place], [place], and [place] : NA    "

## 2021-05-30 NOTE — TELEPHONE ENCOUNTER
Incoming fax received from [employer] requesting: [place] Dept of Public Safety  - Eligibility Verification for Reduced Fee ID Card be completed.  Labeled and placed in Dr. Brunner's mailbox.     Please route any directives to:  MENTAL HEALTH SUPPORT POOL

## 2021-05-31 VITALS — HEIGHT: 61 IN | BODY MASS INDEX: 29.27 KG/M2 | WEIGHT: 155 LBS

## 2021-05-31 VITALS — BODY MASS INDEX: 28.34 KG/M2 | WEIGHT: 150.08 LBS | HEIGHT: 61 IN

## 2021-05-31 VITALS — HEIGHT: 61 IN | WEIGHT: 145 LBS | BODY MASS INDEX: 27.38 KG/M2

## 2021-05-31 VITALS — HEIGHT: 61 IN | BODY MASS INDEX: 28.32 KG/M2 | WEIGHT: 150 LBS

## 2021-05-31 VITALS — HEIGHT: 61 IN | BODY MASS INDEX: 27.94 KG/M2 | WEIGHT: 148 LBS

## 2021-05-31 VITALS — BODY MASS INDEX: 29.85 KG/M2 | WEIGHT: 158.12 LBS | HEIGHT: 61 IN

## 2021-05-31 VITALS — HEIGHT: 61 IN | WEIGHT: 154 LBS | BODY MASS INDEX: 29.07 KG/M2

## 2021-05-31 VITALS — BODY MASS INDEX: 27.94 KG/M2 | WEIGHT: 148 LBS | HEIGHT: 61 IN

## 2021-05-31 VITALS — WEIGHT: 153 LBS | BODY MASS INDEX: 28.89 KG/M2 | HEIGHT: 61 IN

## 2021-05-31 VITALS — HEIGHT: 61 IN | BODY MASS INDEX: 28.7 KG/M2 | WEIGHT: 152 LBS

## 2021-05-31 VITALS — HEIGHT: 60 IN | BODY MASS INDEX: 32.2 KG/M2 | WEIGHT: 164 LBS

## 2021-05-31 NOTE — TELEPHONE ENCOUNTER
Date of Last Office Visit: 5/16/19  Date of Next Office Visit: 8/15/19  No shows since last visit: no  Cancellations since last visit: no  ED visits since last visit: no    Medication Propranolol 20 mg date last ordered: 5/24/19  Qty: 60  Refills: 1    propranolol (INDERAL) 20 MG tablet 60 tablet 1 5/24/2019  No   Sig - Route: Take 1 tablet (20 mg total) by mouth 2 (two) times a day. - Oral       Lapse in therapy greater than 7 days: appears yes  Medication refill request verified as identical to current order: yes  Result of Last DAM, VPA, Li+ Level, CBC, or Carbamazepine Level (at or since last visit): Negative DAM on 5/16/19, positive BUP on 3/15/19        []Eligibility - not seen in last year    []Supervision - no future appointment    [x]Compliance. Possible out of meds for more than 7 days    []Verification - order discrepancy    []Controlled Medication    []90 - day supply request    [x]Other LPN pending medications    Current Medication list:      albuterol (PROVENTIL HFA;VENTOLIN HFA) 90 mcg/actuation inhaler Inhale 2 puffs every 4 (four) hours as needed for wheezing.   ARIPiprazole (ABILIFY) 10 MG tablet Take 1 tablet (10 mg total) by mouth daily.   baclofen (LIORESAL) 20 MG tablet Take 1 tablet (20 mg total) by mouth 3 (three) times a day.   buprenorphine-naloxone (SUBOXONE) 8-2 mg Film per sublingual film Place 1 Film under the tongue 2 (two) times a day.   gabapentin (NEURONTIN) 300 MG capsule Take 2 capsules (600 mg total) by mouth 3 (three) times a day.   hydrOXYzine HCl (ATARAX) 50 MG tablet TAKE 1 TABLET (50 MG TOTAL) BY MOUTH EVERY 6 (SIX) HOURS AS NEEDED FOR ANXIETY.   nicotine polacrilex (COMMIT) 2 MG lozenge Apply 1 lozenge (2 mg total) to the mouth or throat as needed for smoking cessation.   propranolol (INDERAL) 20 MG tablet Take 1 tablet (20 mg total) by mouth 2 (two) times a day.   sertraline (ZOLOFT) 100 MG tablet Take 2 tablets (200 mg total) by mouth daily.       Medication Plan of Care  "at last office visit with MD/CNP:    PLAN:  Diagnoses/Plan:  1. Patient to continue on her current suboxone dose of 8/2 mg two times a day. She was given a 30-day supply of this medication today with 2 refills.    2. Patient to continue on baclofen 20 mg three times a day for sedative cravings. This is an \"off-label\" use of this medication.     3. Continue on current psychiatric medications including gabapentin, abilify, zoloft and trazodone. Patient has been referred to psychiatry and psychology, and has had trouble finding follow-up she is comfortable with. I recommended she contact the insurance company she uses and ask them to refer an in-network psychiatrist in [place] as well as a therapist. Patient strongly recommended to start individual psychotherapy, and plans to do so. At this time I will prescribe psychiatric medications, but this is temporary and she will need to obtain a psychiatrist in the future.   4. Patient recommended to obtain a primary care provider in the [place] area.   5. Patient applauded for her attendance [facility] and strongly encouraged her to try attending meetings in person. Continued to recommend that she do individual therapy to help with anxiety and then try attending meetings in person as able.   6. Patient advised to quit smoking and at this time reports being precontemplative.   7. Urine toxicology with a negative DAM  8. Patient to rtc in 3 months and sooner prn    [place], [place], [place], and [place] : NA  "

## 2021-06-01 VITALS — BODY MASS INDEX: 27.38 KG/M2 | HEIGHT: 61 IN | WEIGHT: 145 LBS

## 2021-06-01 VITALS — HEIGHT: 61 IN | BODY MASS INDEX: 27.38 KG/M2 | WEIGHT: 145 LBS

## 2021-06-01 VITALS — BODY MASS INDEX: 26.43 KG/M2 | HEIGHT: 61 IN | WEIGHT: 140 LBS

## 2021-06-01 VITALS — WEIGHT: 149 LBS | BODY MASS INDEX: 28.13 KG/M2 | HEIGHT: 61 IN

## 2021-06-01 VITALS — BODY MASS INDEX: 27.75 KG/M2 | WEIGHT: 147 LBS | HEIGHT: 61 IN

## 2021-06-01 VITALS — BODY MASS INDEX: 25.49 KG/M2 | WEIGHT: 135 LBS | HEIGHT: 61 IN

## 2021-06-02 VITALS — BODY MASS INDEX: 25.3 KG/M2 | HEIGHT: 61 IN | WEIGHT: 134 LBS

## 2021-06-02 VITALS — WEIGHT: 148 LBS | HEIGHT: 61 IN | BODY MASS INDEX: 27.94 KG/M2

## 2021-06-02 VITALS — BODY MASS INDEX: 27.19 KG/M2 | HEIGHT: 61 IN | WEIGHT: 144 LBS

## 2021-06-02 VITALS — BODY MASS INDEX: 25.3 KG/M2 | WEIGHT: 134 LBS | HEIGHT: 61 IN

## 2021-06-02 NOTE — PROGRESS NOTES
Correct pharmacy verified with patient and confirmed in snapshot? [x] yes []no    Charge captured ? [x] yes  [] no    Medications Phoned  to Pharmacy [] yes [x]no  Name of Pharmacist:  List Medications, including dose, quantity and instructions      Medication Prescriptions given to patient   [] yes  [x] no   List the name of the drug the prescription was written for.       Medications ordered this visit were e-scribed.  Verified by order class [x] yes  [] no  Suboxone   Nicoderm    Medication changes or discontinuations were communicated to patient's pharmacy: [] yes  [x] no    UA collected [x] yes  [] no    [place] Prescription Monitoring Program Reviewed? [x] yes  [] no    Referrals were made to:none      Future appointment was made: [x] yes  [] no    Dictation completed at time of chart check: [x] yes  [] no    I have checked the documentation for today s encounters and the above information has been reviewed and completed.

## 2021-06-03 VITALS — WEIGHT: 166 LBS | HEIGHT: 61 IN | BODY MASS INDEX: 31.34 KG/M2

## 2021-06-03 VITALS
BODY MASS INDEX: 30.58 KG/M2 | WEIGHT: 162 LBS | HEART RATE: 73 BPM | DIASTOLIC BLOOD PRESSURE: 72 MMHG | HEIGHT: 61 IN | SYSTOLIC BLOOD PRESSURE: 113 MMHG

## 2021-06-03 VITALS — HEIGHT: 61 IN | BODY MASS INDEX: 29.45 KG/M2 | WEIGHT: 156 LBS

## 2021-06-03 NOTE — TELEPHONE ENCOUNTER
Date of Last Office Visit: 10-18-19  Date of Next Office Visit: 12-13-19  No shows since last visit: 0  Cancellations since last visit: 0  ED visits since last visit:  0  Medication baclofen  date last ordered: 8-12-19  Qty: 90  Refills: 2  Lapse in therapy greater than 7 days: no  Medication refill request verified as identical to current order: yes  Result of Last DAM, VPA, Li+ Level, CBC, or Carbamazepine Level (at or since last visit): N/A     [] Medication refilled per [facility] M-1.   [x] Medication unable to be refilled by RN due to criteria not met as indicated below:     []Eligibility - not seen in last year    []Supervision - no future appointment    []Compliance     []Verification - order discrepancy    []Controlled Medication    [x]Medication not included in RN Protocol    []90 - day supply request    []Other   Current Medication list:    albuterol (PROVENTIL HFA;VENTOLIN HFA) 90 mcg/actuation inhaler Inhale 2 puffs every 4 (four) hours as needed for wheezing.   baclofen (LIORESAL) 20 MG tablet TAKE 1 TABLET (20 MG TOTAL) BY MOUTH 3 (THREE) TIMES A DAY.   brexpiprazole (REXULTI) 1 mg Tab tablet Take 2 mg by mouth daily.   buprenorphine-naloxone (SUBOXONE) 8-2 mg Film per sublingual film Place 1 Film under the tongue 2 (two) times a day.   fluvoxaMINE (LUVOX) 50 MG tablet    gabapentin (NEURONTIN) 300 MG capsule Take 2 capsules (600 mg total) by mouth 3 (three) times a day.   hydrOXYzine HCl (ATARAX) 50 MG tablet TAKE 1 TABLET (50 MG TOTAL) BY MOUTH EVERY 6 (SIX) HOURS AS NEEDED FOR ANXIETY.   nicotine (NICODERM CQ) 21 mg/24 hr Place 1 patch on the skin daily.   nicotine polacrilex (COMMIT) 2 MG lozenge Apply 1 lozenge (2 mg total) to the mouth or throat as needed for smoking cessation.   sertraline (ZOLOFT) 100 MG tablet Take 2 tablets (200 mg total) by mouth daily.       Medication Plan of Care at last office visit with MD/CNP:  1. Patient to continue on her current suboxone dose of 8/2 mg two times a day.  "She was given a 30-day supply of this medication today with 1 refill.  We discussed if opioids are needed for pain control that she can safely use them for a short period of time, and have her dentist contact our clinic to review safe options if needed.   2. Patient to continue on baclofen 20 mg three times a day for sedative cravings. Continues to be an \"off-label\" use of this medication.     3. Continue on current psychiatric medications per Dr. Josue Viera DO.   4. Patient applauded for her attendance at [employer] and strongly encouraged her to try attending meetings in person. Continued to recommend working with her individual therapist.    5. Patient advised to quit smoking and currently in action phase. Patient prescribed a 21 mg nicotine patch.    6. Urine toxicology pending.   7. Patient to rtc in 2 months and sooner prn.       "

## 2021-06-04 VITALS
WEIGHT: 177 LBS | SYSTOLIC BLOOD PRESSURE: 135 MMHG | BODY MASS INDEX: 33.42 KG/M2 | HEIGHT: 61 IN | HEART RATE: 81 BPM | DIASTOLIC BLOOD PRESSURE: 90 MMHG

## 2021-06-08 NOTE — PROGRESS NOTES
"Addiction  Nurse Only Visit Note    2/1/2017  Date of last visit: 12/28/17    Reason for today's visit:   Chief Complaint   Patient presents with     Follow-up       Vitals:    02/01/17 1322   BP: 105/65   Patient Site: Right Arm   Patient Position: Sitting   Cuff Size: Adult Regular   Pulse: 100   Resp: 18   Temp: 98.8  F (37.1  C)   TempSrc: Oral   Weight: 153 lb (69.4 kg)   Height: 5' (1.524 m)        If having pain, who will address pain:  none    Is pt assisted: No    Urine for toxicology sent today: yes    Last UA date: 1/9/17  Reviewed with patient: yes  Results: negative    AIMS:     Pill count: No  (Controlled substance only)  Medications needing renewal: see meds and orders    Substance use history:    Using alcohol:No  Using street drugs or unprescribed medications: No  Using opioids other that Suboxone:No  Using tobacco:Yes: Describe: half a pack a day of cigarettes    Recovery environment:  Attending formal chemical dependency programming: No  Attending \"outside\" mutual help meetings: No  Has a chemical dependency sponsor: No  Has other elements of structure: yes once week, Mr. Antunez  Current Living Situation: living with 3 children    Cravings: yes had strong cravings yesterday, called a family member    Sleep: no    Depression: yes 4/10 - related to family issues    Anxiety: yes 4/10 - being outside today    Panic Attacks: no    Paranoia: no      Hallucinations: no      Suicidal Ideation: no    Homicidal Ideation:no  Comments: none    Physical Problems: no    Patient Update: Patient reported she has had some cravings in the past couple of weeks, but said the craving were able to pass after talking with a family member. Patient continues to stated the current dose of Suboxone is helpful in controlling her cravings. Patient currently not participating in any CD programming, but was encouraged to do so. She stated she would make an attempt to go to an AA/NA meeting this week. Patient reported moderate " "depression and anxiety, primarily related to issues with older daughter. She denied any SI or HI. Patient continues to see therapist \"Mr. Antunez\" on a weekly basis. She said she would additionally like to see a therapist in this Clinic. Appointment was made with Sp on 17. No other concerns for Dr. Alva.    DAM was negative and BUP was positive today.    Per Dr. Alva directive, phoned in Suboxone  8-2 mg SL film. Dissolve 1 film SL BID. #54 Refill 0. Sp[facility] with pharmacist Brian at [facility]. Order read back for accuracy.     Next appointment with Nurse Only on 17    [place]  reviewed, no worrisome activity noted.    [facility] [place] Date: 17  [employer] Query Report Page#: 1  Patient Rx History Report  FATMATA [facility]LINE  Search Criteria: Last Name 'fatmata' and First Name 'aiden' and  = ' and Request Period = ' to  ' - 4 out of 4 Recipients Selected.  Fill Date Product, Str, Form Qty Days Pt ID Prescriber Written RX# N/R* Pharm MED+  ---------- -------------------------------- ------ ---- --------- ---------- ---------- ------------ ----- --------- ------  2017 SUBOXONE 8 MG-2 MG SL FILM 14.00 7 [id number] 2017 [id number] N [id number] 480.0  2017 SUBOXONE 8 MG-2 MG SL FILM 14.00 7 [id number] 2017 [id number] N [id number] 480.0  2017 GABAPENTIN 300 MG CAPSULE 180.00 30 [id number] 2017 [id number] N [id number] 00.0  2017 SUBOXONE 8 MG-2 MG SL FILM 20.00 10 [id number] 2017 [id number] N [id number] 480.0  2016 SUBOXONE 8 MG-2 MG SL FILM 14.00 7 [id number] 2016 [id number] N [id number] 480.0  2016 SUBOXONE 8 MG-2 MG SL FILM 14.00 7 [id number] 2016 [id number] N [id number] 480.0  2016 GABAPENTIN 300 MG CAPSULE 180.00 30 [id number] 2016 [id number] N [id number] 00.0  2016 SUBOXONE 8 MG-2 MG SL FILM 14.00 7 [id number] 2016 [id number] N " [id number] 480.0  12/09/2016 SUBOXONE 8 MG-2 MG SL FILM 14.00 7 [id number] 12/07/2016 [id number] N [id number] 480.0  11/29/2016 SUBOXONE 8 MG-2 MG SL FILM 16.00 7 [id number] 11/29/2016 [id number] N [id number] 548.6  11/22/2016 GABAPENTIN 300 MG CAPSULE 180.00 30 [id number] 11/22/2016 [id number] N [id number] 00.0  11/22/2016 SUBOXONE 8 MG-2 MG SL FILM 14.00 7 [id number] 11/22/2016 [id number] N [id number] 480.0  11/15/2016 SUBOXONE 8 MG-2 MG SL FILM 14.00 7 [id number] 11/15/2016 [id number] N [id number] 480.0  11/02/2016 SUBOXONE 8 MG-2 MG SL FILM 14.00 7 [id number] 11/01/2016 [id number] N [id number] 480.0  10/24/2016 GABAPENTIN 300 MG CAPSULE 180.00 30 [id number] 08/30/2016 [id number] R [id number] 00.0  09/27/2016 SUBOXONE 8 MG-2 MG SL FILM 60.00 30 [id number] 09/27/2016 [id number] N [id number] 480.0  09/27/2016 GABAPENTIN 300 MG CAPSULE 180.00 30 [id number] 08/30/2016 [id number] R [id number] 00.0  08/30/2016 SUBOXONE 8 MG-2 MG SL FILM 60.00 30 [id number] 08/30/2016 [id number] N [id number] 480.0  08/02/2016 SUBOXONE 8 MG-2 MG SL FILM 56.00 28 [id number] 08/02/2016 [id number] N [id number] 480.0  07/05/2016 SUBOXONE 8 MG-2 MG SL FILM 60.00 30 [id number] 07/05/2016 [id number] N [id number] 480.0  06/28/2016 HYDROCODON-ACETAMINOPHEN 5-325 10.00 2 [id number] 06/28/2016 [id number] N [id number] 25.0  06/08/2016 BUPRENORPHIN-NALOXON 8-2 MG SL 60.00 30 [id number] 06/08/2016 [id number] N [id number] 480.0  05/10/2016 BUPRENORPHIN-NALOXON 8-2 MG SL 60.00 30 [id number] 05/10/2016 [id number] N [id number] 480.0  04/12/2016 BUPRENORPHIN-NALOXON 8-2 MG SL 60.00 30 [id number] 04/12/2016 [id number] N [id number] 480.0  03/29/2016 BUPRENORPHIN-NALOXON 8-2 MG SL 28.00 14 [id number] 03/29/2016 [id number] N [id number] 480.0  03/16/2016 BUPRENORPHIN-NALOXON 8-2 MG SL 28.00 14 [id number] 03/16/2016 [id number] N [id number] 480.0  03/02/2016  BUPRENORPHIN-NALOXON 8-2 MG SL 28.00 14 [id number] 03/02/2016 [id number] N [id number] 480.0  02/17/2016 BUPRENORPHIN-NALOXON 8-2 MG SL 25.00 13 [id number] 02/17/2016 [id number] N [id number] 461.5  02/03/2016 BUPRENORPHIN-NALOXON 8-2 MG SL 28.00 14 [id number] 02/03/2016 [id number] N [id number] 480.0  *N/R N=New R=Refill  +MED Daily  Prescribers for prescriptions listed  ----------------------------------------------------------------------------------------------------------------------------------  [id number] JOSE SIEGEL C; [facility], [address], [place] [address]  [id number] JOSE SIEGEL; 1956, [address],, [facility] [address]  [id number] ROSENDO ALEXANDRA MD; [facility], [address] [place] [address]  [id number] DANGELO NINO MD; [address], [place] [address]  [id number] DOM MARIE MD; [facility], [address] [place] [id number]  Report Disclaimers:  The report provided above is based upon the search criteria and the data provided by the dispensing entities. For more information  about any prescription, please contact the dispenser or the prescriber.  This report contains confidential information, including patient identifiers, and is not a public record. The information on this  report must be treated as      Education Done Today  Patient Instructions:   Patient Instructions   Continue Medications as ordered.  No alcohol or unprescribed drug use.  No driving, if sedated.  Come to the Emergency Room if not feeling safe.  Call the clinic with any questions [phone].  If you identify that you are pregnant, please call us to inform us, as medications may need to be changed.  You can also contact Urgent Care Crisis Line at [phone] 24 hours a day for help.  Follow up as directed, for your appointments, per your After Visit Summary Form.              David J Myhre    2/1/2017 1:26  PM

## 2021-06-09 NOTE — PROGRESS NOTES
"Addiction  Nurse Only Visit Not    2/28/2017  Date of last visit: 2/1/17    Reason for today's visit:   Chief Complaint   Patient presents with     Follow-up       Vitals:    02/28/17 1223   BP: 110/73   Patient Site: Right Arm   Patient Position: Sitting   Cuff Size: Adult Regular   Pulse: 93   Temp: 99.1  F (37.3  C)   TempSrc: Oral   Weight: 151 lb (68.5 kg)   Height: 5' (1.524 m)        If having pain, who will address pain:  No pain    Is pt assisted: No    Urine for toxicology sent today: yes    Last UA date: 2/1/17  Reviewed with patient: yes  Results: DAM neg, BUP pos    AIMS:     Pill count: NA  (Controlled substance only)  Medications needing renewal: see note    Substance use history:    Using alcohol:No  Using street drugs or unprescribed medications: No  Using opioids other that Suboxone:No  Using tobacco:Yes: Describe: 7 cigs/day    Recovery environment:  Attending formal chemical dependency programming: No  Attending \"outside\" mutual help meetings: Yes: Describe: went to two meetings this month, has a goal to get to three meetings next  Has a chemical dependency sponsor: No  Has other elements of structure: Yes: Describe: brought back NA book, taking care of kids, sees therapist Mr. Antunez weekly  Current Living Situation: own place    Cravings: yes still has dreams weekly     Sleep: yes it's getting better, sleeps 5-6 hr/noc    Depression: 3/5    Anxiety: yes 1/5    Panic Attacks: no    Paranoia: no    Hallucinations: no    Suicidal Ideation: no    Homicidal Ideation:no  Comments: none    Physical Problems: no    [place]  was reviewed prior to seeing patient today. See below for embedded report.    Note: Missed appt yesterday for DA, says she will reschedule, would like to see a different therapist. Attended two meetings this month and has a goal to increase that to three. Liked the meetings she went to and felt good about getting out of the apartment. 17 yr old daughter is living with Grandmother, " reports that is working out but that she misses her. She is only a couple blocks away, so she does try to see her as much as she can. The [facility] order is in place for a year but will be reviewed in 6 months. Frustrated with residents of her building, says someone called child protection on her again and they came out to investigate, nothing came of it according to Raisa.    Suboxone 8/2 mg sl film takes bid, #30 0-RF, called to pharmacy, spoke with pharmacists Zulema, order read back for accuracy.    Raisa requested refill of Zoloft, says she has been out 10 days, last fill was 5/10/16 with 2 refills, she states that she has had bottles of leftover Zoloft that she used up but she is now out. Will pend to Dr. Alva for refill.       Patient Instructions   Continue Medications as ordered.  No alcohol or unprescribed drug use.  No driving, if sedated.  Come to the Emergency Room if not feeling safe.  Call the clinic with any questions [phone].  Follow up as directed, for your appointments, per your After Visit Summary Form.      Call the clinic if any concerns: [facility] [phone]  [facility] [phone]  and Report to the emergency room if you feel like harming yourself or others or are psychotic        Jane Devries    2017 12:31 PM      FATMATA RAISA  Search Criteria: Last Name 'fatmata' and First Name 'raisa' and  = ' and Request Period = ' to  ' - 4 out of 4 Recipients Selected.  Fill Date Product, Str, Form Qty Days Pt ID Prescriber Written RX# N/R* Pharm MED+  ---------- -------------------------------- ------ ---- --------- ---------- ---------- ------------ ----- --------- ------  2017 SUBOXONE 8 MG-2 MG SL FILM 54.00 28 [id number] 2017 [id number] N [id number] 462.9  2017 SUBOXONE 8 MG-2 MG SL FILM 14.00 7 [id number] 2017 [id number] N [id number] 480.0  2017 SUBOXONE 8 MG-2 MG SL FILM 14.00 7 [id number]  2017 [id number] N [id number] 480.0  2017 GABAPENTIN 300 MG CAPSULE 180.00 30 [id number] 2017 [id number] N [id number] 00.0  2017 SUBOXONE 8 MG-2 MG SL FILM 20.00 10 [id number] 2017 [id number] N [id number] 480.0  2016 SUBOXONE 8 MG-2 MG SL FILM 14.00 7 [id number] 2016 [id number] N [id number] 480.0  2016 SUBOXONE 8 MG-2 MG SL FILM 14.00 7 [id number] 2016 [id number] N [id number] 480.0  2016 GABAPENTIN 300 MG CAPSULE 180.00 30 [id number] 2016 [id number] N [id number] 00.0  2016 SUBOXONE 8 MG-2 MG SL FILM 14.00 7 [id number] 2016 [id number] N [id number] 480.0  2016 SUBOXONE 8 MG-2 MG SL FILM 14.00 7 [id number] 2016 [id number] N [id number] 480.0  2016 SUBOXONE 8 MG-2 MG SL FILM 16.00 7 [id number] 2016 [id number] N [id number] 548.6  *N/R N=New R=Refill  +MED Daily  Prescribers for prescriptions listed  ----------------------------------------------------------------------------------------------------------------------------------  [id number] DANGELO NINO MD; [address], [place] [address]  Pharmacies that dispensed prescriptions listed  ----------------------------------------------------------------------------------------------------------------------------------  [id number] [facility]; : [facility] [id number], [address] [place] [address],  [id number] [facility] : [id number],  [address] [address],  Patients that match search criteria  ----------------------------------------------------------------------------------------------------------------------------------  [id number] FATMATA KLINE,  79;  [place] [address]  [id number] FATMATA ANTONIO,  [birth year];  [address] , [place] [address]  [id number] FATMATA OCHOA,  [birth year];  [place] [address]  [id number]  FATMATA ANTONIO, [place] 79; [facility] [address]  MED Summary  This section displays cumulative MED values by unique recipient. The MED Max value is the maximum occurrence of cumulative MED  sustained for any 3 consecutive days. This value is calculated based on prescriptions dispensed during the date range requested.  -----------------------------------------------------------------------------------------------------------------------------------  548.57 RAISA AVILEZ; 1975;  [facility] [address]

## 2021-06-09 NOTE — PROGRESS NOTES
Patient here today for follow up of medication management. [facility] has maintained sobriety. [facility] has been going to meetings. Depression 2/5 denies SI/HI, anxiety 2/5.     Health Information [place] Date: 03/15/17  [employer] Query Report Page#: 1  Patient Rx History Report  FATMATA KLINE  Search Criteria: Last Name 'fatmata' and First Name 'Jennifer' and  =  and Request Period = '12/15/16' to  '03/15/17' - 4 out of 4 Recipients Selected.  Fill Date Product, Str, Form Qty Days Pt ID Prescriber Written RX# N/R* Pharm Pay MED+  ---------- -------------------------------- ------ ---- --------- ---------- ---------- ------------ ----- --------- ---------- ------  2017 SUBOXONE 8 MG-2 MG SL FILM 30.00 15 [id number] 2017 [id number] N [id number] 480.0  2017 GABAPENTIN 300 MG CAPSULE 180.00 30 [id number] 2017 [id number] N [id number] 00.0  2017 SUBOXONE 8 MG-2 MG SL FILM 54.00 28 [id number] 2017 [id number] N [id number] 462.9  2017 SUBOXONE 8 MG-2 MG SL FILM 14.00 7 [id number] 2017 [id number] N [id number] 480.0  2017 SUBOXONE 8 MG-2 MG SL FILM 14.00 7 [id number] 2017 [id number] N [id number] 480.0  2017 GABAPENTIN 300 MG CAPSULE 180.00 30 [id number] 2017 [id number] N [id number] 00.0  2017 SUBOXONE 8 MG-2 MG SL FILM 20.00 10 [id number] 2017 [id number] N [id number] 480.0  2016 SUBOXONE 8 MG-2 MG SL FILM 14.00 7 [id number] 2016 [id number] N [id number] 480.0  2016 SUBOXONE 8 MG-2 MG SL FILM 14.00 7 [id number] 2016 [id number] N [id number] 480.0  2016 GABAPENTIN 300 MG CAPSULE 180.00 30 [id number] 2016 [id number] N [id number] 00.0  *N/R N=New R=Refill  +MED Daily  Prescribers for prescriptions listed  ----------------------------------------------------------------------------------------------------------------------------------  [id number] TRUNG  DANGELO SALAS MD; [address], [place] [address]  Pharmacies that dispensed prescriptions listed  ----------------------------------------------------------------------------------------------------------------------------------  [id number] [facility] : [id number], [address] [place] [address],  [id number] [facility]; : [id number],  [address] [address],  Patients that match search criteria  ----------------------------------------------------------------------------------------------------------------------------------  [id number] FATMATA MIRE, [facility] [address];  [place] [address]  [id number] WHITE RAISA S,  [birth year];  [address] [address]  [id number] FATMATA MIRE DEL,  79;  [place] [address]  [id number] WHITE RAISA S,  [birth year]; , [place] [address]  MED Summary  This section displays cumulative MED values by unique recipient. The MED Max value is the maximum occurrence of cumulative MED  sustained for any 3 consecutive days. This value is calculated based on prescriptions dispensed during the date range requested.  -----------------------------------------------------------------------------------------------------------------------------------  480 RAISA AVILEZ; 1975; [address] [facility] [address]

## 2021-06-09 NOTE — PROGRESS NOTES
Correct pharmacy verified with patient and confirmed in snapshot? [x] yes []no    Medications Phoned  to Pharmacy [] yes [x]no  Name of Pharmacist:  List Medications, including dose, quantity and instructions      Medication Prescriptions given to patient   [] yes  [x] no   List the name of the drug the prescription was written for.       Medications ordered this visit were e-scribed.  Verified by order class [x] yes  [] no  Suboxone 8-2 mg    Medication changes or discontinuations were communicated to patient's pharmacy: [] yes  [x] no    UA collected [x] yes    [] no    [place] Prescription Monitoring Program Reviewed? [x] yes  [] no    Referrals were made to: none    Future appointment was made: [x] yes  [] no    Dictation completed at time of chart check: [x] yes  [] no    I have checked the documentation for today s encounters and the above information has been reviewed and completed.

## 2021-06-10 NOTE — PROGRESS NOTES
"Addiction  Nurse Only Visit Note    4/10/2017  Date of last visit: 3/15/17    Reason for today's visit:   Chief Complaint   Patient presents with     Medication Refill       Vitals:    04/10/17 1117   BP: 110/72   Patient Site: Right Arm   Patient Position: Sitting   Cuff Size: Adult Regular   Pulse: 92   Resp: 16   Temp: 98.4  F (36.9  C)   TempSrc: Oral   Weight: 163 lb (73.9 kg)   Height: 5' (1.524 m)        If having pain, who will address pain:  PCP    Is pt assisted: No    Urine for toxicology sent today: yes    Last UA date: 3/15/17  Reviewed with patient: yes  Results: NEG    AIMS:     Pill count: No  (Controlled substance only)  Medications needing renewal: see meds and orders    Substance use history:    Using alcohol:No  Using street drugs or unprescribed medications: No  Using opioids other that Suboxone:No  Using tobacco:Yes: Describe: 1/2 ppd    Recovery environment:  Attending formal chemical dependency programming: No  Attending \"outside\" mutual help meetings: Yes: Describe: 3x a month  Has a chemical dependency sponsor: No  Has other elements of structure: No  Current Living Situation: apartment    Cravings: yes minimal, only when stressed    Sleep: yes taking benadryl nightly    Depression: yes 4/5 w/ Terrie's dad staying with her    Anxiety: yes only when leaves the house    Panic Attacks: no    Paranoia: no      Hallucinations: no      Suicidal Ideation: no    Homicidal Ideation:no  Comments: denies    Physical Problems: no    Pt arrives for appt on time, is able to only give a small amount of urine for DAM and is informed that if that is not enough for test that she may need to return to clinic which she agrees to do. Pt is very talkative about her youngest daughters father who recently moved back into the apartment with them, she says it is causing stress in her life as she has not had a good relationship with him in the past. He recently was kicked out of his apartment after being caught " "dealing heroin with his GF and is now trying to get a regular job while staying with Kaylee. She hopes he won't stay with her long, and is worried that when he leaves her baby will miss him and that will \"start more problems if he doesn't come around again\". Pt requesting refills on her current medications which were pended to Dr Alva as normal. Suboxone 8/2mg film dissolve 1 film SL twice daily # 60 refills 0 Do not fill before 17 phoned in to [facility] at [facility] with read back for accuracy.   Next appt is 17 with NO.   reviewed and embedded below.       Education Done Today  Patient Instructions:   Patient Instructions   Continue Medications as ordered.  No alcohol or unprescribed drug use.  No driving, if sedated.  Come to the Emergency Room if not feeling safe.  Call the clinic with any questions [phone].  If you identify that you are pregnant, please call us to inform us, as medications may need to be changed.  You can also contact Urgent Care Crisis Line at [phone] 24 hours a day for help.  Follow up as directed, for your appointments, per your After Visit Summary Form.      Aleshia Bassett    4/10/2017 11:41 AM    [employer] [place] Date: 04/10/17  [employer] Query Report Page#: 1  Patient Rx History Report  FATMATA KLINE  Search Criteria: Last Name 'fatmata' and First Name 'aiden' and  = '[birth year]' and Request Period = '10/12/16' to  '04/10/17' - 4 out of 4 Recipients Selected.  Fill Date Product, Str, Form Qty Days Pt ID Prescriber Written RX# N/R* Pharm Pay MED+  ---------- -------------------------------- ------ ---- --------- ---------- ---------- ------------ ----- --------- ---------- ------  2017 GABAPENTIN 300 MG CAPSULE 180.00 30 [id number] 2017 [id number] N [id number] 00.0  03/15/2017 SUBOXONE 8 MG-2 MG SL FILM 60.00 30 [id number] 03/15/2017 [id number] N [id number] 480.0  2017 SUBOXONE 8 MG-2 MG SL FILM 30.00 15 [id number] " 02/28/2017 [id number] N [id number] 480.0  02/23/2017 GABAPENTIN 300 MG CAPSULE 180.00 30 [id number] 02/23/2017 [id number] N [id number] 00.0  02/02/2017 SUBOXONE 8 MG-2 MG SL FILM 54.00 28 [id number] 02/02/2017 [id number] N [id number] 462.9  01/26/2017 SUBOXONE 8 MG-2 MG SL FILM 14.00 7 [id number] 01/26/2017 [id number] N [id number] 480.0  01/19/2017 SUBOXONE 8 MG-2 MG SL FILM 14.00 7 [id number] 01/18/2017 [id number] N [id number] 480.0  01/18/2017 GABAPENTIN 300 MG CAPSULE 180.00 30 [id number] 01/18/2017 [id number] N [id number] 00.0  01/09/2017 SUBOXONE 8 MG-2 MG SL FILM 20.00 10 [id number] 01/09/2017 [id number] N [id number] 480.0  12/28/2016 SUBOXONE 8 MG-2 MG SL FILM 14.00 7 [id number] 12/28/2016 [id number] N [id number] 480.0  12/21/2016 SUBOXONE 8 MG-2 MG SL FILM 14.00 7 [id number] 12/21/2016 [id number] N [id number] 480.0  12/20/2016 GABAPENTIN 300 MG CAPSULE 180.00 30 [id number] 12/20/2016 [id number] N [id number] 00.0  12/14/2016 SUBOXONE 8 MG-2 MG SL FILM 14.00 7 [id number] 12/14/2016 [id number] N [id number] 480.0  12/09/2016 SUBOXONE 8 MG-2 MG SL FILM 14.00 7 [id number] 12/07/2016 [id number] N [id number] 480.0  11/29/2016 SUBOXONE 8 MG-2 MG SL FILM 16.00 7 [id number] 11/29/2016 [id number] N [id number] 548.6  11/22/2016 GABAPENTIN 300 MG CAPSULE 180.00 30 [id number] 11/22/2016 [id number] N [id number] 00.0  11/22/2016 SUBOXONE 8 MG-2 MG SL FILM 14.00 7 [id number] 11/22/2016 [id number] N [id number] 480.0  11/15/2016 SUBOXONE 8 MG-2 MG SL FILM 14.00 7 [id number] 11/15/2016 [id number] N [id number] 480.0  11/02/2016 SUBOXONE 8 MG-2 MG SL FILM 14.00 7 [id number] 11/01/2016 [id number] N [id number] 480.0  10/24/2016 GABAPENTIN 300 MG CAPSULE 180.00 30 [id number] 08/30/2016 [id number] R [id number] 00.0  *N/R N=New R=Refill  +MED Daily  Prescribers for prescriptions  listed  ----------------------------------------------------------------------------------------------------------------------------------  [id number] DANGELO NINO MD; [address] [place] [address] [address], [place] [address]  Pharmacies that dispensed prescriptions listed  ----------------------------------------------------------------------------------------------------------------------------------  [id number] [facility]; : [facility] [id number], [address] [place] [address],  [id number] [facility]LR[facility]CR[facility]; : [id number],  [address] [address],  Patients that match search criteria  ----------------------------------------------------------------------------------------------------------------------------------  [id number] FATMATA KLINE,  79;  [address], [facility] [address]  [id number] FATMATA ANTONIO,  77;  [address] , [facility] [address]  [id number] FATMATA KLINE DEL,  77;  [address], [facility] [address]  [id number] FATMATA ANTONIO,  77; , [facility] [address]  MED Summary  This section displays cumulative MED values by unique recipient. The MED Max value is the maximum occurrence of cumulative MED

## 2021-06-10 NOTE — PROGRESS NOTES
"Addiction  Nurse Only Visit Note    5/8/2017  Date of last visit: 4/10/17    Reason for today's visit:   Chief Complaint   Patient presents with     Medication Refill       There were no vitals filed for this visit.     If having pain, who will address pain:  PCP    Is pt assisted: No    Urine for toxicology sent today: yes    Last UA date: 4/10/17  Reviewed with patient: yes  Results: neg    AIMS:     Pill count: No  (Controlled substance only)  Medications needing renewal: see meds and orders    Substance use history:    Using alcohol:No  Using street drugs or unprescribed medications: No  Using opioids other that Suboxone:No  Using tobacco:Yes: Describe: 1/2 ppd    Recovery environment:  Attending formal chemical dependency programming: No  Attending \"outside\" mutual help meetings: 3 times a month  Has a chemical dependency sponsor: No  Has other elements of structure: No  Current Living Situation: own apartment    Cravings: minimal    Sleep: yes, varies    Depression: yes at times    Anxiety: yes better with ex gone    Panic Attacks: no    Paranoia: no      Hallucinations: no      Suicidal Ideation: no    Homicidal Ideation:no  Comments: denies    Physical Problems: no     Pt arrives for appt on time in pleasant mood with good eye contact, answers questions easily and without hesitation. Pt states her life has been \" a little better\" since her ex has now moved out again, but is still upset that he has not paid back the $700 she borrowed him from the taxes. Denies other concerns today, would like other meds refilled which will be pended to Dr Alva, Suboxone will be phoned in.   Per Dr Alva directive Suboxone 8/2mg dissolve 1 film sublingually twice daily #54 refill 0 phoned in to Sabi at [facility] with read back for accuracy.  reviewed and embedded below. Next appt is with NO on 6/5/17.  Pt informed via phone call that she will need to contact PCP for refills of Omeprazole, she acknowledges understanding " and says she will call today.   Education Done Today  Patient Instructions:   Patient Instructions   Continue Medications as ordered.  No alcohol or unprescribed drug use.  No driving, if sedated.  Come to the Emergency Room if not feeling safe.  Call the clinic with any questions [phone].  If you identify that you are pregnant, please call us to inform us, as medications may need to be changed.  You can also contact Urgent Care Crisis Line at [phone] 24 hours a day for help.  Follow up as directed, for your appointments, per your After Visit Summary Form.      Aleshia DE LA CRUZ Bassett    2017 10:53 AM    [employer] [place] Date: 17  [employer] Query Report Page#: 1  Patient Rx History Report  FATMATA KLINE  Search Criteria: Last Name 'fatmata' and First Name 'aiden' and  =  and Request Period = ' to  ' - 4 out of 4 Recipients Selected.  Fill Date Product, Str, Form Qty Days Pt ID Prescriber Written RX# N/R* Pharm Pay MED+  ---------- -------------------------------- ------ ---- --------- ---------- ---------- ------------ ----- --------- ---------- ------  2017 GABAPENTIN 300 MG CAPSULE 180.00 30 [id number] 2017 [id number] N [id number] 00.0  2017 SUBOXONE 8 MG-2 MG SL FILM 60.00 30 [id number] 04/10/2017 [id number] N [id number] 480.0  2017 GABAPENTIN 300 MG CAPSULE 180.00 30 [id number] 2017 [id number] N [id number] 00.0  03/15/2017 SUBOXONE 8 MG-2 MG SL FILM 60.00 30 [id number] 03/15/2017 [id number] N [id number] 480.0  2017 SUBOXONE 8 MG-2 MG SL FILM 30.00 15 [id number] 2017 [id number] N [id number] 480.0  2017 GABAPENTIN 300 MG CAPSULE 180.00 30 [id number] 2017 [id number] N [id number] 00.0  2017 SUBOXONE 8 MG-2 MG SL FILM 54.00 28 [id number] 2017 [id number] N [id number] 462.9  2017 SUBOXONE 8 MG-2 MG SL FILM 14.00 7 [id number] 2017 [id number] N  [id number] 480.0  01/19/2017 SUBOXONE 8 MG-2 MG SL FILM 14.00 7 [id number] 01/18/2017 [id number] N [id number] 480.0  01/18/2017 GABAPENTIN 300 MG CAPSULE 180.00 30 [id number] 01/18/2017 [id number] N [id number] 00.0  01/09/2017 SUBOXONE 8 MG-2 MG SL FILM 20.00 10 [id number] 01/09/2017 [id number] N [id number] 480.0  12/28/2016 SUBOXONE 8 MG-2 MG SL FILM 14.00 7 [id number] 12/28/2016 [id number] N [id number] 480.0  12/21/2016 SUBOXONE 8 MG-2 MG SL FILM 14.00 7 [id number] 12/21/2016 [id number] N [id number] 480.0  12/20/2016 GABAPENTIN 300 MG CAPSULE 180.00 30 [id number] 12/20/2016 [id number] N [id number] 00.0  12/14/2016 SUBOXONE 8 MG-2 MG SL FILM 14.00 7 [id number] 12/14/2016 [id number] N [id number] 480.0  12/09/2016 SUBOXONE 8 MG-2 MG SL FILM 14.00 7 [id number] 12/07/2016 [id number] N [id number] 480.0  11/29/2016 SUBOXONE 8 MG-2 MG SL FILM 16.00 7 [id number] 11/29/2016 [id number] N [id number] 548.6  11/22/2016 GABAPENTIN 300 MG CAPSULE 180.00 30 [id number] 11/22/2016 [id number] N [id number] 00.0  11/22/2016 SUBOXONE 8 MG-2 MG SL FILM 14.00 7 [id number] 11/22/2016 [id number] N [id number] 480.0  11/15/2016 SUBOXONE 8 MG-2 MG SL FILM 14.00 7 [id number] 11/15/2016 [id number] N [id number] 480.0  11/09/2016 SUBOXONE 8 MG-2 MG SL FILM 14.00 7 [id number] 11/09/2016 [id number] N [id number] 480.0  *N/R N=New R=Refill  +MED Daily  Prescribers for prescriptions listed  ----------------------------------------------------------------------------------------------------------------------------------  [id number] DANGELO NINO MD; [address] [place], [place] [id number], [place] [address]  Pharmacies that dispensed prescriptions listed  ----------------------------------------------------------------------------------------------------------------------------------  [id number] [facility].; : [id number], [address] [facility] [place] [address],  [id number] [facility],  TIFFANY; : [id number],  [address], [facility] [address],  Patients that match search criteria  ----------------------------------------------------------------------------------------------------------------------------------  [id number] FATMATA KLINE [facility] 77;  CARMEN MCKINLEY, [place] [address]  [id number] FATMATA ANTONIO, [facility] 77;  CARMEN MCKINLEY , [place] [address]  [id number] FATMATA OCHOA, [facility] 77;  [place] [address]  [id number] FATMATA ANTONIO, [facility] 77; , [facility] [id number] [place] [address] Summary  Report Disclaimers:  The report provided above is based upon the search criteria and the data provided by the dispensing entities. For more

## 2021-06-11 NOTE — PROGRESS NOTES
"Addiction  Nurse Only Visit Note    6/5/2017  Date of last visit: 5/8/17    Reason for today's visit:   Chief Complaint   Patient presents with     Medication Refill       Vitals:    06/05/17 1034   BP: (!) 143/93   Patient Site: Right Arm   Patient Position: Sitting   Cuff Size: Adult Regular   Pulse: 98   Resp: 16   Temp: 98.8  F (37.1  C)   TempSrc: Oral   Weight: 164 lb (74.4 kg)   Height: 5' (1.524 m)        If having pain, who will address pain:  PCP    Is pt assisted: No    Urine for toxicology sent today: yes    Last UA date: 5/8/17  Reviewed with patient: yes  Results: neg    AIMS:     Pill count: No  (Controlled substance only)  Medications needing renewal: see meds and orders    Substance use history:    Using alcohol:No  Using street drugs or unprescribed medications: No  Using opioids other that Suboxone:No  Using tobacco:Yes: Describe: 10 cigs a day    Recovery environment:  Attending formal chemical dependency programming: No  Attending \"outside\" mutual help meetings: No  Has a chemical dependency sponsor: No  Has other elements of structure: No  Current Living Situation: own apartment    Cravings: yes some with increased anxiety    Sleep: no    Depression: no    Anxiety: yes more with recent concerns at home    Panic Attacks: no    Paranoia: no      Hallucinations: no      Suicidal Ideation: no    Homicidal Ideation:no  Comments: denies    Physical Problems: no    Pt arrives for appt, is weepy at times today as a result of concerns with her living situation lately. Pt has increased anxiety and slight increase in cravings r/t the anxiety. Pt is meeting with her housing team after this appt and is nervous she will be \"kicked out\" and have no place to go in 60 days. She denies use, is not sure how many Suboxone she has left at home but says she is taking all meds as prescribed.  is reviewed and embedded below. Next appt is with Dr Alva and is for 7/11/17. Per Dr casey, Suboxone 8/2mg film " dissolve 1 film SL twice daily #60 refills 0 phoned it to Awal at [facility] with read back for accuracy. Hydroxyzine 50mg PRN dose refilled via phone as well, per pt request.     Education Done Today  Patient Instructions:   Patient Instructions   Continue Medications as ordered.  No alcohol or unprescribed drug use.  No driving, if sedated.  Come to the Emergency Room if not feeling safe.  Call the clinic with any questions [phone].  If you identify that you are pregnant, please call us to inform us, as medications may need to be changed.  You can also contact Urgent Care Crisis Line at [phone] 24 hours a day for help.  Follow up as directed, for your appointments, per your After Visit Summary Form.      Aleshia Bassett    2017 10:50 AM    [employer] Date: 17  [employer] Query Report Page#: 1  Patient Rx History Report  FATMATA KLINE  Search Criteria: Last Name 'fatmata' and First Name 'aiden' and  =  and Request Period = '16' to  ' - 4 out of 4 Recipients Selected.  Fill Date Product, Str, Form Qty Days Pt ID Prescriber Written RX# N/R* Pharm **MED+  ---------- -------------------------------- ------ ---- --------- ---------- ---------- ------------ ----- --------- ------  2017 GABAPENTIN 300 MG CAPSULE 180.00 30 [id number] 2017 [id number] N [id number] 00.0  2017 SUBOXONE 8 MG-2 MG SL FILM 54.00 27 [id number] 2017 [id number] N [id number] 480.0  2017 GABAPENTIN 300 MG CAPSULE 180.00 30 [id number] 2017 [id number] N [id number] 00.0  2017 SUBOXONE 8 MG-2 MG SL FILM 60.00 30 [id number] 04/10/2017 [id number] N [id number] 480.0  2017 GABAPENTIN 300 MG CAPSULE 180.00 30 [id number] 2017 [id number] N [id number] 00.0  03/15/2017 SUBOXONE 8 MG-2 MG SL FILM 60.00 30 [id number] 03/15/2017 [id number] N [id number] 480.0  2017 SUBOXONE 8 MG-2 MG SL FILM 30.00 15 [id number]  02/28/2017 [id number] N [id number] 480.0  02/23/2017 GABAPENTIN 300 MG CAPSULE 180.00 30 [id number] 02/23/2017 [id number] N [id number] 00.0  02/02/2017 SUBOXONE 8 MG-2 MG SL FILM 54.00 28 [id number] 02/02/2017 [id number] N [id number] 462.9  01/26/2017 SUBOXONE 8 MG-2 MG SL FILM 14.00 7 [id number] 01/26/2017 [id number] N [id number] 480.0  01/19/2017 SUBOXONE 8 MG-2 MG SL FILM 14.00 7 [id number] 01/18/2017 [id number] N [id number] 480.0  01/18/2017 GABAPENTIN 300 MG CAPSULE 180.00 30 [id number] 01/18/2017 [id number] N [id number] 00.0  01/09/2017 SUBOXONE 8 MG-2 MG SL FILM 20.00 10 [id number] 01/09/2017 [id number] N [id number] 480.0  12/28/2016 SUBOXONE 8 MG-2 MG SL FILM 14.00 7 [id number] 12/28/2016 [id number] N [id number] 480.0  12/21/2016 SUBOXONE 8 MG-2 MG SL FILM 14.00 7 [id number] 12/21/2016 [id number] N [id number] 480.0  12/20/2016 GABAPENTIN 300 MG CAPSULE 180.00 30 [id number] 12/20/2016 [id number] N [id number] 00.0  12/14/2016 SUBOXONE 8 MG-2 MG SL FILM 14.00 7 [id number] 12/14/2016 [id number] N [id number] 480.0  12/09/2016 SUBOXONE 8 MG-2 MG SL FILM 14.00 7 [id number] 12/07/2016 [id number] N [id number] 480.0  *N/R N=New R=Refill  +MED Daily  Prescribers for prescriptions listed  ----------------------------------------------------------------------------------------------------------------------------------  [id number] DANGELO NINO MD; [address] [place] [address] [address], [place] [address]  Pharmacies that dispensed prescriptions listed  ----------------------------------------------------------------------------------------------------------------------------------  [id number] [facility]; : [facility] [id number], [address] [place] [address],  [id number] [facility], LRodolfoLRodolfoCRodolfo; : [id number], [address] [address],  Patients that match search  criteria  ----------------------------------------------------------------------------------------------------------------------------------  [id number] FATMATA KLINE, [facility] 77;  [facility] [address]  [id number] FATMATA ANTONIO, [facility] 77;  CARMEN MCKINLEY , [facility] [address]  [id number] FATMATA OCHOA, [facility] 77;  [facility] [address]  [id number] FATMATA ANTONIO, [facility] 77; , [facility] [address]  **Per CDC guidance, the conversion factors and associated daily morphine milligram equivalents for drugs prescribed as part of  medication-assisted treatment for opioid use disorder should not be used to benchmark against dosage thresholds meant for opioids  prescribed for pain.  Report Disclaimers:  The report provided above is based upon the search criteria and the data provided by the dispensing entities. For more information

## 2021-06-12 NOTE — PROGRESS NOTES
"Addiction  Nurse Only Visit Note    8/9/2017  Date of last visit: 6/5/17    Reason for today's visit:   Chief Complaint   Patient presents with     Addiction     nurse only for Dr. Alva       There were no vitals filed for this visit.     If having pain, who will address pain:  NA    Is pt assisted: No    Urine for toxicology sent today: yes    Last UA date: 7/11/17  Reviewed with patient: yes  Results: negative  BUP: 6/5/17 was positive, next due 9/2017    DISCUS:  8/2016   DUE    Pill count: No  (Controlled substance only)  Medications needing renewal: Suboxone 8-2mg film BID #60 with no refills called to the pharmacy spoke with Lucia    Substance use history:    Using alcohol:No  Using street drugs or unprescribed medications: No  Using opioids other that Suboxone:No  Using tobacco:Yes: Describe: 1/2 pack of cigarettes per day    Recovery environment:  Attending formal chemical dependency programming: No  Attending \"outside\" mutual help meetings: No, last meeting attended was June  Has a chemical dependency sponsor: No  Has other elements of structure: No  Current Living Situation: homeless, living in the [facility] shelter for the past month with her two daughters ages 15 and 21/2. Yesterday applied for an apt.  Will hear next week.    Cravings: 2-5/5, does some activity to take her mind off it, craving is related to being homeless    Sleep: curfew at the shelter is at 10pm, must be in their rooms.  Gets about 4 hours of sleep.  Feels tired and drained all the shoshana.    Depression: 5/5.  Therapist is currently unavailable.  Therapist will let Jennifer know when he resumes setting up appointments.  Has not seen him in over a month.    Anxiety: 3/5 depends on the situation    Panic Attacks: every two weeks    Paranoia: no      Hallucinations: no      Suicidal Ideation: no    Homicidal Ideation:no     DISCUS:  8/2016  DUE    Comments:  reviewed and embedded                      Next appointment is 9/6 with Dr." Artie                      Takes hydroxyzine every hs    Physical Problems: no        Education Done Today  Patient Instructions:   Patient Instructions   Continue Medications as ordered.  No alcohol or unprescribed drug use.  No driving, if sedated.  Come to the Emergency Room if not feeling safe.  Call the clinic with any questions [phone].  If you identify that you are pregnant, please call us to inform us, as medications may need to be changed.  You can also contact Urgent Care Crisis Line at [phone] 24 hours a day for help.  Follow up as directed, for your appointments, per your After Visit Summary Form.      Hallie Granados    2017 10:47 AM    [employer] [place] Date: 17  Query Report Page#: 1  Patient Rx History Report  FATMATA KLINE  Search Criteria: Last Name 'fatmata' and First Name 'aiden' and  =  and Request Period = ' to  ' - 4 out of 4 Recipients Selected.  Fill Date Product, Str, Form Qty Days Pt ID Prescriber Written RX# N/R* Pharm **MED+  ---------- -------------------------------- ------ ---- --------- ---------- ---------- ------------ ----- --------- ------  2017 GABAPENTIN 300 MG CAPSULE 180.00 30 [id number] 2017 [id number] N [id number] 00.0  2017 SUBOXONE 8 MG-2 MG SL FILM 60.00 30 [id number] 2017 [id number] N [id number] 480.0  2017 SUBOXONE 8 MG-2 MG SL FILM 15.00 7 [id number] 2017 [id number] N [id number] 514.3  2017 GABAPENTIN 300 MG CAPSULE 180.00 30 [id number] 2017 [id number] N [id number] 00.0  2017 SUBOXONE 8 MG-2 MG SL FILM 60.00 30 [id number] 2017 [id number] N [id number] 480.0  2017 GABAPENTIN 300 MG CAPSULE 180.00 30 [id number] 2017 [id number] N [id number] 00.0  *N/R N=New R=Refill  +MED Daily  Prescribers for prescriptions  listed  ----------------------------------------------------------------------------------------------------------------------------------  [id number] DANGELO NINO MD; [address] [place], [place] [id number], [place] [address]  [id number] YURI HOLT MD; [facility], [address] [place] [address]  Pharmacies that dispensed prescriptions listed  ----------------------------------------------------------------------------------------------------------------------------------  [id number] [facility] : [id number],  [address] [address],  [id number] [facility] : [facility] [id number], [address] [place] [address],  Patients that match search criteria  ----------------------------------------------------------------------------------------------------------------------------------  [id number] FATMATA KLINE, [facility] 77;  KALI ARMSTRONG, [place] [id number]  [id number] FATMATA ANTONIO, [facility] 77;  KALI ARMSTRONG , [place] [address]  [id number] FATMATA OCHOA, [facility] 77;  KALI ARMSTRONG, [place] [address]  [id number] FATMATA ANTONIO, [facility] 77; , [facility] [address]  MED Summary  This section displays cumulative MED values by unique recipient. The MED Max value is the maximum occurrence of cumulative MED  sustained for any 3 consecutive days. This value is calculated based on prescriptions dispensed during the date range requested.  -----------------------------------------------------------------------------------------------------------------------------------  514.29 RAISA AVILEZ; [address], [place] [id number]

## 2021-06-13 NOTE — PROGRESS NOTES
This therapist left a message for patient after she no showed to call the clinic back if wanting to start therapy.

## 2021-06-13 NOTE — PROGRESS NOTES
Patient here today for follow up of medication management. States she is homeless right now. 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 Went to [facility] for withdrawal. States depression  denies SI/HI, anxiety 3/5.       [employer] [place] Date: 10/04/17  Query Report Page#: 1  Patient Rx History Report  FATMATA KLINE  Search Criteria: Last Name 'fatmata' and First Name 'Jennifer' and  =  and Request Period =  to  '10/04/17' - 4 out of 4 Recipients Selected.  Fill Date Product, Str, Form Qty Days Pt ID Prescriber Written RX# N/R* Pharm **MED+  ---------- -------------------------------- ------ ---- --------- ---------- ---------- ------------ ----- --------- ------  2017 GABAPENTIN 300 MG CAPSULE 180.00 30 [id number] 2017 [id number] N [id number] 00.0  2017 SUBOXONE 8 MG-2 MG SL FILM 56.00 28 [id number] 2017 [id number] N [id number] 480.0  2017 GABAPENTIN 300 MG CAPSULE 180.00 30 [id number] 2017 [id number] N [id number] 00.0  2017 SUBOXONE 8 MG-2 MG SL FILM 60.00 30 [id number] 2017 [id number] N [id number] 480.0  2017 GABAPENTIN 300 MG CAPSULE 180.00 30 [id number] 2017 [id number] N [id number] 00.0  2017 SUBOXONE 8 MG-2 MG SL FILM 60.00 30 [id number] 2017 [id number] N [id number] 480.0  *N/R N=New R=Refill  +MED Daily  Prescribers for prescriptions listed  ----------------------------------------------------------------------------------------------------------------------------------  [id number] DANGELO NINO MD; [address], [place] [address]  Pharmacies that dispensed prescriptions listed  ----------------------------------------------------------------------------------------------------------------------------------  [id number] [facility] : [id number],  [address], [facility] [address],  [id number]  TIFFANY MATA; : [facility] [id number], [address] [facility] [address],  Patients that match search criteria  ----------------------------------------------------------------------------------------------------------------------------------  [id number] FATMATA KLINE, [facility] [place] [facility] 77;  [place] [address]  [id number] FATMATA ANTONIO, [facility] [place] [facility] 77;  KALI ARMSTRONG , [place] [address]  [id number] FATMATA OCHOA, [facility] [place] [facility] 77;  [place] [address]  [id number] FATMATA ANTONIO, [facility] [place] [facility] 77; , [place] [address]  MED Summary  This section displays cumulative MED values by unique recipient. The MED Max value is the maximum occurrence of cumulative MED  sustained for any 3 consecutive days. This value is calculated based on prescriptions dispensed during the date range requested.  -----------------------------------------------------------------------------------------------------------------------------------  480 RAISA AVILEZ; [address] [id number]

## 2021-06-13 NOTE — PROGRESS NOTES
"Addiction  Nurse Only Visit Note    10/19/2017  Date of last visit: 10/12/17    Reason for today's visit:   Chief Complaint   Patient presents with     Follow-up       Vitals:    10/19/17 1048   BP: 132/84   Patient Site: Right Arm   Patient Position: Sitting   Cuff Size: Adult Regular   Pulse: 80   Temp: 98.9  F (37.2  C)   TempSrc: Oral   Weight: 155 lb (70.3 kg)   Height: 5' 1\" (1.549 m)        If having pain, who will address pain:  No pain    Is pt assisted: No    Urine for toxicology sent today: yes    Last UA date: 10/12/17  Reviewed with patient: yes  Results: Dam neg    AIMS:     Pill count: NA  (Controlled substance only)  Medications needing renewal: none    Substance use history:    Using alcohol:No  Using street drugs or unprescribed medications: No  Using opioids other that Suboxone:No  Using tobacco:Yes: Describe: 1/2 pack daily    Recovery environment:  Attending formal chemical dependency programming: No  Attending \"outside\" mutual help meetings: No  Has a chemical dependency sponsor: No  Has other elements of structure: Yes: Describe: takes kids to [employer], \"shelter is horrible\"  Current Living Situation: [facility]    Cravings: yes 5/5    Sleep: yes sleeps 4-6 hr/noc    Depression: yes 5/5    Anxiety: yes 4/5    Panic Attacks: no    Paranoia: no    Hallucinations: no    Suicidal Ideation: no    Homicidal Ideation:no  Comments: none    Physical Problems: no    [place]  was reviewed prior to seeing patient today. See below for embedded report.    Note: Neatly groomed. Pleasant and cooperative. Reports depression and anxiety are high, says shelter is horrible, nothing for kids to do. Hasn't had any help making applications for housing. Is waiting to hear about a place she found on her own. Denies any relapse. Hasn't gone to any meetings, encouraged her to attend some meetings for support, establish a recovery environment.    Suboxone 8/2 mg sl film, takes bid, # 12 0-RF, called to pharmacy, " spoke with pharmacist Lucia, order read back for accuracy.    Patient Instructions   Continue Medications as ordered.  No alcohol or unprescribed drug use.  No driving, if sedated.  Come to the Emergency Room if not feeling safe.  Call the clinic with any questions [phone].  Follow up as directed, for your appointments, per your After Visit Summary Form.      Call the clinic if any concerns: [facility] [phone]  [facility] [phone]  and Report to the emergency room if you feel like harming yourself or others or are psychotic        Jane NOHEMY Kayce    10/19/2017 10:51 AM    FATMATA RAISA  Search Criteria: Last Name 'fatmata' and First Name 'raisa' and  =  and Request Period =  to  '10/18/17' - 4 out of 4 Recipients Selected.  Fill Date Product, Str, Form Qty Days Pt ID Prescriber Written RX# N/R* Pharm **MED+  ---------- -------------------------------- ------ ---- --------- ---------- ---------- ------------ ----- --------- ------  10/17/2017 GABAPENTIN 300 MG CAPSULE 180.00 30 [id number] 10/17/2017 [id number] N [id number] 00.0  10/12/2017 SUBOXONE 8 MG-2 MG SL FILM 14.00 7 [id number] 10/12/2017 [id number] N [id number] 480.0  10/04/2017 BUPRENORPHIN-NALOXON 8-2 MG SL 14.00 7 [id number] 10/04/2017 [id number] N [id number] 480.0  2017 GABAPENTIN 300 MG CAPSULE 180.00 30 [id number] 2017 [id number] N [id number] 00.0  2017 SUBOXONE 8 MG-2 MG SL FILM 56.00 28 [id number] 2017 [id number] N [id number] 480.0  2017 GABAPENTIN 300 MG CAPSULE 180.00 30 [id number] 2017 [id number] N [id number] 00.0  2017 SUBOXONE 8 MG-2 MG SL FILM 60.00 30 [id number] 2017 [id number] N [id number] 480.0  2017 GABAPENTIN 300 MG CAPSULE 180.00 30 [id number] 2017 [id number] N [id number] 00.0  *N/R N=New R=Refill  +MED Daily  Prescribers for prescriptions  listed  ----------------------------------------------------------------------------------------------------------------------------------  [id number] DANGELO NINO MD; [address], [place] [address]  [id number] ROSANNA BAILEY MD; [address], [facility], [place] [address]  Pharmacies that dispensed prescriptions listed  ----------------------------------------------------------------------------------------------------------------------------------  [id number] [facility] : [id number], [address] [facility] [address],  Patients that match search criteria  ----------------------------------------------------------------------------------------------------------------------------------  [id number] KALPESH TARANGO [address];  KALI ARMSTRONG, [place] [id number]  [id number] FATMATA ANTONIO,  77;  KALI ARMSTRONG , [place] [id number]  [id number] FATMATA OCHOA,  77;  KALI ARMSTRONG, [place] [address]  [id number] FATMATA ANTONIO,  77; , [facility] [address]  MED Summary  This section displays cumulative MED values by unique recipient. The MED Max value is the maximum occurrence of cumulative MED  sustained for any 3 consecutive days. This value is calculated based on prescriptions dispensed during the date range requested.  -----------------------------------------------------------------------------------------------------------------------------------  [address] RAISA AVILEZ; 1975;  Kali Armstrong, [facility] [address]

## 2021-06-13 NOTE — PROGRESS NOTES
"Addiction  Nurse Only Visit Note    10/12/2017  Date of last visit: 10/4/17    Reason for today's visit:   Chief Complaint   Patient presents with     Addiction     nurse only for Dr. Artie Santiago:    10/12/17 1326   Weight: 148 lb (67.1 kg)        If having pain, who will address pain:  Needs a dentist    Is pt assisted: No    Urine for toxicology sent today: yes    Last UA date: 10/4/17  Reviewed with patient: yes  Results: negative  BUP: 10/4/17, next due 1/2018      Pill count: No  (Controlled substance only)  Medications needing renewal: Suboxone 8-2mg BID #14 no refills called to the pharmacy spoke with Diego    Substance use history:    Using alcohol:No  Using street drugs or unprescribed medications: No  Using opioids other that Suboxone:No  Using tobacco:Yes: Describe: 10 cigarettes per day    Recovery environment:  Attending formal chemical dependency programming: No  Attending \"outside\" mutual help meetings: No  Has a chemical dependency sponsor: No  Has other elements of structure: No  Current Living Situation: shelter with daughter    Cravings: no    Sleep: last night had a full night's sleep    Depression: 8/10, has an appointment with psychotherapist Lynette on 10/26/17    Anxiety: especially around people,5/10    Panic Attacks: once a week    Paranoia: no      Hallucinations: no      Suicidal Ideation: no    Homicidal Ideation:no     Comments:  rreviewed and embeded                      Next appointment is 10/19/17 in nurse only clinic                      Has psychotherapy intake scheduled with Lynette on 10/26/17                      Continues living in a shelter, may have an apartment soon, but it only has a shower, no tub                      Toddler daughter accompanied her Mother today, child wet herself and diaper needed to be changed                       Jennifer's movements were slow, lethargic, her eyes hooded. She interacted well with her daughter, cleaned her up thoroughly and " gently.    Physical Problems: no        Education Done Today  Patient Instructions:   Patient Instructions   Continue Medications as ordered.  No alcohol or unprescribed drug use.  No driving, if sedated.  Come to the Emergency Room if not feeling safe.  Call the clinic with any questions [phone].  If you identify that you are pregnant, please call us to inform us, as medications may need to be changed.  You can also contact Urgent Care Crisis Line at [phone] 24 hours a day for help.  Follow up as directed, for your appointments, per your After Visit Summary Form.      Hallie Granados    10/12/2017 1:28 PM    [employer] [place] Date: 10/12/17  Query Report Page#: 1  Patient Rx History Report  FATMATA KLINE  Search Criteria: Last Name 'fatmata' and First Name 'aiden' and  =  and Request Period = ' to  '10/12/17' - 4 out of 4 Recipients Selected.  Fill Date Product, Str, Form Qty Days Pt ID Prescriber Written RX# N/R* Pharm **MED+  ---------- -------------------------------- ------ ---- --------- ---------- ---------- ------------ ----- --------- ------  10/04/2017 BUPRENORPHIN-NALOXON 8-2 MG SL 14.00 7 [id number] 10/04/2017 [id number] N [id number] 480.0  2017 GABAPENTIN 300 MG CAPSULE 180.00 30 [id number] 2017 [id number] N [id number] 00.0  2017 SUBOXONE 8 MG-2 MG SL FILM 56.00 28 [id number] 2017 [id number] N [id number] 480.0  2017 GABAPENTIN 300 MG CAPSULE 180.00 30 [id number] 2017 [id number] N [id number] 00.0  2017 SUBOXONE 8 MG-2 MG SL FILM 60.00 30 [id number] 2017 [id number] N [id number] 480.0  2017 GABAPENTIN 300 MG CAPSULE 180.00 30 [id number] 2017 [id number] N [id number] 00.0  *N/R N=New R=Refill  +MED Daily  Prescribers for prescriptions listed  ----------------------------------------------------------------------------------------------------------------------------------  [id number]  TRUNG, DANGELO SALAS MD; [address], [place] [address]  Pharmacies that dispensed prescriptions listed  ----------------------------------------------------------------------------------------------------------------------------------  [id number] [facility] : [facility] # [id number], [address],[place] [address],  Patients that match search criteria  ----------------------------------------------------------------------------------------------------------------------------------  [id number] KALPESH TARANGO [address];  [facility] [address]  [id number] KALPESH HARPER [address] [place], [place] [id number]

## 2021-06-13 NOTE — PROGRESS NOTES
Chart was reviewed.  Concur with RN assessment and medications prescribed until next scheduled f/u.  Have identified follow-up on 11/1 with another nurse only

## 2021-06-13 NOTE — PROGRESS NOTES
"Addiction  Nurse Only Visit Note    10/25/2017  Date of last visit: 10/19/2017    Reason for today's visit: follow-up  Chief Complaint   Patient presents with     Follow-up       Vitals:    10/25/17 1106   BP: 130/76   Patient Site: Right Arm   Patient Position: Sitting   Cuff Size: Adult Regular   Pulse: 87   Resp: 18   Temp: 98.6  F (37  C)   TempSrc: Oral   Weight: 150 lb 1.3 oz (68.1 kg)   Height: 5' 1\" (1.549 m)        If having pain, who will address pain:  4 tooth pain to get in with dentist    Is pt assisted: No    Urine for toxicology sent today: yes    Last UA date: 10/19/2017  Reviewed with patient: yes  Results: negative     DISCUS:  Due next MD visit    Pill count: No  (Controlled substance only)  Medications needing renewal: Buprenorphine-Naloxone    Substance use history:    Using alcohol:No  Using street drugs or unprescribed medications: No  Using opioids other that Suboxone:No  Using tobacco:Yes: Describe: less than 0.5 pack per day    Recovery environment:  Attending formal chemical dependency programming: No  Attending \"outside\" mutual help meetings: No  Has a chemical dependency sponsor: No  Has other elements of structure: Yes: Describe: Lynette, first appointment tomorrow.  Current Living Situation: Homeless shelter    Cravings: yes 3out of 5    Sleep: yes issues with falling and staying asleep    Depression: yes 4 out of 5    Anxiety: yes 3 out of 5.    Panic Attacks: no    Paranoia: no      Hallucinations: no      Suicidal Ideation: no    Homicidal Ideation:no  Comments: NA    Physical Problems: yes tooth pain, to see dentist, and also URI, and on Sudafed for that.    Has URI, and put [facility].  Also, dental issues and will be trying to make an appointment with a dentist.  Living at homeless shelter in [place].  Sleep an issue, did not say it was due to living in homeless shelter.  Will start meeting with Lynette for therapy, tomorrow.  DAM results today were: + for BUP, " otherwise negatvie.  Buprenorphine-Naloxone 8-2 mg film, taking one film twice daily, under tongue, #16 and no refills was called to Lucia pharmacist at [facility].  Instructions were repeated back to insure accuracy and to attempt to prevent errors.    Education Done Today  Patient Instructions:   Patient Instructions   Continue Medications as ordered.  No alcohol or unprescribed drug use.  No driving, if sedated.  Come to the Emergency Room if not feeling safe.  Call the clinic with any questions [phone].  If you identify that you are pregnant, please call us to inform us, as medications may need to be changed.  You can also contact Urgent Care Crisis Line at [phone] 24 hours a day for help.  Follow up as directed, for your appointments, per your After Visit Summary Form.      [facility] [place] Date: 10/25/17  Query Report Page#: 1  Patient Rx History Report  FATMATA KLINE  Search Criteria: Last Name 'fatmata' and First Name 'aiden' and  =  and Request Period =  to  '10/25/17' - 4 out of 4 Recipients Selected.  Fill Date Product, Str, Form Qty Days Pt ID Prescriber Written RX# N/R* Pharm **MED+  ---------- -------------------------------- ------ ---- --------- ---------- ---------- ------------ ----- --------- ------  10/19/2017 SUBOXONE 8 MG-2 MG SL FILM 12.00 6 [id number] 10/19/2017 [id number] N [id number] 480.0  10/17/2017 GABAPENTIN 300 MG CAPSULE 180.00 30 [id number] 10/17/2017 [id number] N [id number] 00.0  10/12/2017 SUBOXONE 8 MG-2 MG SL FILM 14.00 7 [id number] 10/12/2017 [id number] N [id number] 480.0  10/04/2017 BUPRENORPHIN-NALOXON 8-2 MG SL 14.00 7 [id number] 10/04/2017 [id number] N [id number] 480.0  2017 GABAPENTIN 300 MG CAPSULE 180.00 30 [id number] 2017 [id number] N [id number] 00.0  2017 SUBOXONE 8 MG-2 MG SL FILM 56.00 28 [id number] 2017 [id number] N [id number] 480.0  2017 GABAPENTIN 300 MG CAPSULE  180.00 30 [id number] 2017 [id number] N [id number] 00.0  2017 SUBOXONE 8 MG-2 MG SL FILM 60.00 30 [id number] 2017 [id number] N [id number] 480.0  *N/R N=New R=Refill  +MED Daily  Prescribers for prescriptions listed  ----------------------------------------------------------------------------------------------------------------------------------  [id number] DANGELO NINO MD; [address], [place] [address]  [id number] ROSANNA BAILEY MD; [address], [facility], [place] [address]  Pharmacies that dispensed prescriptions listed  ----------------------------------------------------------------------------------------------------------------------------------  [id number] [facility] : [facility] # [id number], [address] [facility], [facility] [address],  Patients that match search criteria  ----------------------------------------------------------------------------------------------------------------------------------  [id number] FATMATA KLINE, [facility] 77;  KALI ARMSTRONG, [place] [address]  [id number] FATMATA ANTONIO, [facility] 77;  [address] [place] [address]  [id number] FATMATA OCHOA, [facility] 77;  KALI ARMSTRONG, [place] [address]  [id number] FATMATA ANTONIO, [facility] 77; , [facility] [address]  MED Summary  This section displays cumulative MED values by unique recipient. The MED Max value is the maximum occurrence of cumulative MED  sustained for any 3 consecutive days. This value is calculated based on prescriptions dispensed during the date range requested.  -----------------------------------------------------------------------------------------------------------------------------------  [address] RAISA AVILEZ; 1975;  Kali Armstrong, [facility] [address]  **Per CDC guidance, the conversion factors and associated daily morphine milligram equivalents for drugs prescribed as part of  medication-assisted treatment  for opioid use disorder should not be used to benchmark against dosage thresholds meant for opioids  prescribed for pain.  Report Disclaimers:  The report provided above is based upon the search criteria and the data provided by the dispensing entities. For more information  about any prescription, please contact the dispenser or the prescriber.  This report contains confidential information, including patient identifiers, and is not a public record. The information on this  report must be treated as protected health information and is to be disclosed to others only as authorized by applicable state  and Federal regulations.      Ban Lu    10/25/2017 11:19 AM

## 2021-06-14 NOTE — PROGRESS NOTES
"Addiction  Nurse Only Visit Note    11/30/2017  Date of last visit: 11/14/17    Reason for today's visit:   Chief Complaint   Patient presents with     Follow-up       Vitals:    11/30/17 1008   BP: 131/64   Patient Site: Right Arm   Patient Position: Sitting   Cuff Size: Adult Regular   Pulse: 65   Temp: 98.8  F (37.1  C)   TempSrc: Oral   Weight: 148 lb (67.1 kg)   Height: 5' 1\" (1.549 m)        If having pain, who will address pain:  No pain    Is pt assisted: No    Urine for toxicology sent today: yes    Last UA date: 11/14/17  Reviewed with patient: yes  Results: DAM neg    AIMS:     Pill count: NA  (Controlled substance only)  Medications needing renewal: see note    Substance use history:    Using alcohol:No  Using street drugs or unprescribed medications: No  Using opioids other that Suboxone:No  Using tobacco:Yes: Describe: 5 cig/daily    Recovery environment:  Attending formal chemical dependency programming: No  Attending \"outside\" mutual help meetings: Yes: Describe: NA meeting every two weeks  Has a chemical dependency sponsor: No  Has other elements of structure: Yes: Describe: housing searching  Current Living Situation: homeless shelter    Cravings: no    Sleep: yes sleeps approx 5 hr/night    Depression: yes 7/10    Anxiety: yes 6/10    Panic Attacks: no    Paranoia: no    Hallucinations: no    Suicidal Ideation: no    Homicidal Ideation:no  Comments: none    Physical Problems: no    [place]  was reviewed prior to seeing patient today. See below for embedded report.    Note: UA collected and sent to lab. No issues or concerns related to suboxone. Is able to go to NA meeting at shelter, every other week. Continues to look for housing. Youngest daughter was in ED last weekend for upper respiratory infection.    Subxone 8/2 mg sl film, takes bid,#28 0-RF, called to pharmacy, spoke to pharmacists Lucia, order read back for accuracy. Next appt 12/28/17, Nurse Visit.    Patient Instructions   Continue " Medications as ordered.  No alcohol or unprescribed drug use.  No driving, if sedated.  Come to the Emergency Room if not feeling safe.  Call the clinic with any questions [phone].  Follow up as directed, for your appointments, per your After Visit Summary Form.      Call the clinic if any concerns: [facility] [phone]  [facility] [phone]  and Report to the emergency room if you feel like harming yourself or others or are psychotic        Jane Devries    2017 10:10 AM    FATMATA KLINE  Search Criteria: Last Name 'fatmata' and First Name 'aiden' and  =  and Request Period =  to   - 4 out of 4 Recipients Selected.  Fill Date Product, Str, Form Qty Days Pt ID Prescriber Written RX# N/R* Pharm **MED+  ---------- -------------------------------- ------ ---- --------- ---------- ---------- ------------ ----- --------- ------  2017 GABAPENTIN 300 MG CAPSULE 180.00 30 [id number] 2017 [id number] N [id number] 00.0  11/15/2017 SUBOXONE 8 MG-2 MG SL FILM 32.00 16 [id number] 2017 [id number] N [id number] 480.0  2017 SUBOXONE 8 MG-2 MG SL FILM 24.00 12 [id number] 2017 [id number] N [id number] 480.0  10/25/2017 SUBOXONE 8 MG-2 MG SL FILM 16.00 8 [id number] 10/25/2017 [id number] N [id number] 480.0  10/19/2017 SUBOXONE 8 MG-2 MG SL FILM 12.00 6 [id number] 10/19/2017 [id number] N [id number] 480.0  10/17/2017 GABAPENTIN 300 MG CAPSULE 180.00 30 [id number] 10/17/2017 [id number] N [id number] 00.0  10/12/2017 SUBOXONE 8 MG-2 MG SL FILM 14.00 7 [id number] 10/12/2017 [id number] N [id number] 480.0  10/04/2017 BUPRENORPHIN-NALOXON 8-2 MG SL 14.00 7 [id number] 10/04/2017 [id number] N [id number] 480.0  2017 GABAPENTIN 300 MG CAPSULE 180.00 30 [id number] 2017 [id number] N [id number] 00.0  2017 SUBOXONE 8 MG-2 MG SL FILM 56.00 28 [id number] 2017 [id number] N [id number] 480.0  *N/R  N=New R=Refill  +MED Daily  Prescribers for prescriptions listed  ----------------------------------------------------------------------------------------------------------------------------------  [id number] DANGELO NINO MD; [address], [place] [address]  [id number] ROSANNA BAILEY MD; [address], [facility], [place] [address]  [id number] BRUNNER, EMILY A MD; [facility], [address] [place] [address],, [place] [address]  Pharmacies that dispensed prescriptions listed  ----------------------------------------------------------------------------------------------------------------------------------  [id number] [facility] : [facility] # [id number], [address] [facility],, [facility] [address],  Patients that match search criteria  ----------------------------------------------------------------------------------------------------------------------------------  [id number] FATMATA KLINE, [facility] 77;  KALI ARMSTRONG, [place] [address]  [id number] FATMATA ANTONIO, [facility] 77;  KALI ARMSTRONG , [place] [address]  [id number] FATMATA OCHOA, [facility] 77;  KALI ARMSTRONG, [place] [address]  [id number] FATMATA ANTONIO, [facility] 77; , [place] [address]  MED Summary  This section displays cumulative MED values by unique recipient. The MED Max value is the maximum occurrence of cumulative MED  sustained for any 3 consecutive days. This value is calculated based on prescriptions dispensed during the date range requested.  -----------------------------------------------------------------------------------------------------------------------------------  [address] RAISA AVILEZ; [address] Kali Armstrong, [place] [address]

## 2021-06-14 NOTE — PROGRESS NOTES
"Addiction  Nurse Only Visit Note    11/14/2017  Date of last visit: 10/25/17    Reason for today's visit:   Chief Complaint   Patient presents with     Follow-up       Vitals:    11/14/17 1320   BP: 128/78   Patient Site: Right Arm   Patient Position: Sitting   Cuff Size: Adult Regular   Pulse: 70   Temp: 98.7  F (37.1  C)   TempSrc: Oral   Weight: 150 lb (68 kg)   Height: 5' 1\" (1.549 m)        If having pain, who will address pain:  No pain    Is pt assisted: No    Urine for toxicology sent today: yes    Last UA date: 11/3/17  Reviewed with patient: yes  Results: DAM neg, BUP pos    AIMS:     Pill count: NA  (Controlled substance only)  Medications needing renewal: see note    Substance use history:    Using alcohol:No  Using street drugs or unprescribed medications: No  Using opioids other that Suboxone:No  Using tobacco:Yes: Describe: under 1/2 pack daily    Recovery environment:  Attending formal chemical dependency programming: No  Attending \"outside\" mutual help meetings: No  Has a chemical dependency sponsor: No  Has other elements of structure: Yes: Describe: NA groups and meeting at shelter  Current Living Situation: moved to new shelter (week ago Saturday    Cravings: yes 1/5    Sleep: yes, I still struggle with that, sleeps 4-6 hr night    Depression: yes 4/5    Anxiety: yes 2/5    Panic Attacks: no    Paranoia: no    Hallucinations: no    Suicidal Ideation: no    Homicidal Ideation:no  Comments: none    Physical Problems: yes problems with stomach    [place]  was reviewed prior to seeing patient today. See below for embedded report.    Note: Pt accompanied by youngest daughter, child very active. Pt appeared frustrated by daughter's behavior. Pt recently moved to new shelter with children, feels that it is an improvement over her last housing. Hopeful she will be able to get youngest daughter into some  classes. Jennifer has not been to meetings this week but she says there are NA meetings at this " new shelter.     Suboxone 8/2 mg sl film, takes bid, #32 0-RF, called to pharmacy, spoke with pharmacist Cliff, order read back for accuracy. Pharmacist says that their computer system has been down off and on. Next appt 2017.      Patient Instructions   Continue Medications as ordered.  No alcohol or unprescribed drug use.  No driving, if sedated.  Come to the Emergency Room if not feeling safe.  Call the clinic with any questions [phone].  Follow up as directed, for your appointments, per your After Visit Summary Form.      Call the clinic if any concerns: [facility] [phone]  [facility] [phone]  and Report to the emergency room if you feel like harming yourself or others or are psychotic        Jane Devries    2017 1:32 PM    FATMATA KLINE  Search Criteria: Last Name 'fatmata' and First Name 'aiden' and  = ' and Request Period = '08/15/17' to  ' - 4 out of 4 Recipients Selected.  Fill Date Product, Str, Form Qty Days Pt ID Prescriber Written RX# N/R* Pharm **MED+  ---------- -------------------------------- ------ ---- --------- ---------- ---------- ------------ ----- --------- ------  2017 SUBOXONE 8 MG-2 MG SL FILM 24.00 12 [id number] 2017 [id number] N [id number] 480.0  10/25/2017 SUBOXONE 8 MG-2 MG SL FILM 16.00 8 [id number] 10/25/2017 [id number] N [id number] 480.0  10/19/2017 SUBOXONE 8 MG-2 MG SL FILM 12.00 6 [id number] 10/19/2017 [id number] N [id number] 480.0  10/17/2017 GABAPENTIN 300 MG CAPSULE 180.00 30 [id number] 10/17/2017 [id number] N [id number] 00.0  10/12/2017 SUBOXONE 8 MG-2 MG SL FILM 14.00 7 [id number] 10/12/2017 [id number] N [id number] 480.0  10/04/2017 BUPRENORPHIN-NALOXON 8-2 MG SL 14.00 7 [id number] 10/04/2017 [id number] N [id number] 480.0  2017 GABAPENTIN 300 MG CAPSULE 180.00 30 [id number] 2017 [id number] N [id number] 00.0  2017 SUBOXONE 8 MG-2 MG SL FILM 56.00 28  [id number] 2017 [id number] N [id number] 480.0  2017 GABAPENTIN 300 MG CAPSULE 180.00 30 [id number] 2017 [id number] N [id number] 00.0  *N/R N=New R=Refill  +MED Daily  Prescribers for prescriptions listed  ----------------------------------------------------------------------------------------------------------------------------------  [id number] DANGELO NINO MD; [address], [place] [address]  [id number] ROSANNA BAILEY MD; [address], [facility], [place] [address]  Pharmacies that dispensed prescriptions listed  ----------------------------------------------------------------------------------------------------------------------------------  [id number] [facility] : [facility] [id number], [address], [facility] [address],  Patients that match search criteria  ----------------------------------------------------------------------------------------------------------------------------------  [id number] FATMATA KLINE, [facility] 77;  KALI ARMSTRONG, [place] [address]  [id number] FATMATA MIRDONOVAN ANTONIO, [facility] [address];  KALI ARMSTRONG , [place] [address]  [id number] FATMATA KLINE GABRIELA, [facility] [address];  KALI ARMSTRONG, [place] [address]  [id number] FATMATA MIRE S, [facility] [address]; , [place] [address]  MED Summary  This section displays cumulative MED values by unique recipient. The MED Max value is the maximum occurrence of cumulative MED  sustained for any 3 consecutive days. This value is calculated based on prescriptions dispensed during the date range requested.  -----------------------------------------------------------------------------------------------------------------------------------  [address] RAISA AVILEZ; [address], [place] [address]

## 2021-06-14 NOTE — PROGRESS NOTES
Chart was reviewed.  Concur with RN assessment and medications prescribed until next scheduled f/u.

## 2021-06-14 NOTE — PROGRESS NOTES
"Addiction  Nurse Only Visit Note    12/14/2017  Date of last visit: 11/30/17    Reason for today's visit:   Chief Complaint   Patient presents with     Follow-up       Vitals:    12/14/17 1118   BP: 143/81   Patient Site: Right Arm   Patient Position: Sitting   Cuff Size: Adult Regular   Pulse: 72   Temp: 99  F (37.2  C)   TempSrc: Oral   Weight: 153 lb (69.4 kg)   Height: 5' 1\" (1.549 m)        If having pain, who will address pain:  Plans on making dental appt    Is pt assisted: No    Urine for toxicology sent today: yes    Last UA date: 11/30/17  Reviewed with patient: yes  Results: DAM neg    AIMS:     Pill count: NA  (Controlled substance only)  Medications needing renewal: see note    Substance use history:    Using alcohol:No  Using street drugs or unprescribed medications: No  Using opioids other that Suboxone:No  Using tobacco:Yes: Describe: 5 cigs/daily    Recovery environment:  Attending formal chemical dependency programming: No  Attending \"outside\" mutual help meetings: Yes: Describe: weekly meeting at shelter  Has a chemical dependency sponsor: No  Has other elements of structure: Yes: Describe: housing search, caring for children  Current Living Situation: homeless shelter    Cravings: yes 5/10    Sleep: yes so-so, sleeps 4-5 hr/noc    Depression: yes 6/10    Anxiety: yes 5/10    Panic Attacks: no    Paranoia: no    Hallucinations: no    Suicidal Ideation: no    Homicidal Ideation:no  Comments: none    Physical Problems: yes has had dental pain last three days, got referral for dentist from shelter    [place]  was reviewed prior to seeing patient today. See below for embedded report.    Note: Neatly groomed, dressed appropriately for the weather. Pt came to visit without any children today, when this writer commented on how much more relaxed she appeared, pt said that she missed her \"baby\". Appears tired. Says sleep is so-so, discussed sleep hygiene, says she can't sleep without the TV being on at " "night. Continues to stay at shelter, but is actively looking for housing. Critical that shelter advocates aren't able to help her more. Cravings are 5/10 for xanex. Attends NA meeting at shelter weekly. Encouraged pt to focus on her recovery environment for support. Brother offered pt to move in with him but she said, \"he just wants to fix me\", \"he uses weed and doesn't think there's nothing wrong with that\".    Suboxone 8/2 mg sl film, takes bid #38, 0-RF, called to pharmacy, spoke with pharmacist Radha, order read back for accuracy. Next available appt 1/2/18 Nurse clinic.    Patient requests Gabapentin refill:  Date of Last Office Visit: 11/30/17  Date of Next Office Visit: 1/2/18  No shows since last visit: none  Cancellations since last visit: none  Medication Gabapentin date last ordered: 11/16/17  Qty: 180  Refills: 0  Lapse in therapy greater than 7 days: no  Medication refill request verified as identical to current order: yes  Result of Last DAM, VPA, Li+ Level, or Carbamazepine Level (at or since last visit): Negative, DAM 11/30/17     [x] Medication refilled per [facility] M-1.   [] Medication unable to be refilled by RN due to criteria not met as indicated below:     []Eligibility - not seen in last year    []Supervision - no future appointment    []Compliance     []Verification - order discrepancy    []Controlled Medication    []Medication not included in RN Protocol    []90 - day supply request    []Other     Patient Instructions   Continue Medications as ordered.  No alcohol or unprescribed drug use.  No driving, if sedated.  Come to the Emergency Room if not feeling safe.  Call the clinic with any questions [phone].  Follow up as directed, for your appointments, per your After Visit Summary Form.      Call the clinic if any concerns: [facility] [phone]  [facility] [phone]  and Report to the emergency room if you feel like harming yourself or others or are psychotic        Jane SLATER" Kayce    2017 11:26 AM    FATMATA KLINE  Search Criteria: Last Name 'fatmata' and First Name 'aiden' and  = ' and Request Period = ' to  ' - 4 out of 4 Recipients Selected.  Fill Date Product, Str, Form Qty Days Pt ID Prescriber Written RX# N/R* Pharm **MED+  ---------- -------------------------------- ------ ---- --------- ---------- ---------- ------------ ----- --------- ------  2017 SUBOXONE 8 MG-2 MG SL FILM 28.00 14 [id number] 2017 [id number] N [id number] 480.0  2017 GABAPENTIN 300 MG CAPSULE 180.00 30 [id number] 2017 [id number] N [id number] 00.0  11/15/2017 SUBOXONE 8 MG-2 MG SL FILM 32.00 16 [id number] 2017 [id number] N [id number] 480.0  2017 SUBOXONE 8 MG-2 MG SL FILM 24.00 12 [id number] 2017 [id number] N [id number] 480.0  10/25/2017 SUBOXONE 8 MG-2 MG SL FILM 16.00 8 [id number] 10/25/2017 [id number] N [id number] 480.0  10/19/2017 SUBOXONE 8 MG-2 MG SL FILM 12.00 6 [id number] 10/19/2017 [id number] N [id number] 480.0  10/17/2017 GABAPENTIN 300 MG CAPSULE 180.00 30 [id number] 10/17/2017 [id number] N [id number] 00.0  10/12/2017 SUBOXONE 8 MG-2 MG SL FILM 14.00 7 [id number] 10/12/2017 [id number] N [id number] 480.0  10/04/2017 BUPRENORPHIN-NALOXON 8-2 MG SL 14.00 7 [id number] 10/04/2017 [id number] N [id number] 480.0  2017 GABAPENTIN 300 MG CAPSULE 180.00 30 [id number] 2017 [id number] N [id number] 00.0  *N/R N=New R=Refill  +MED Daily  Prescribers for prescriptions listed  ----------------------------------------------------------------------------------------------------------------------------------  [id number] DANGELO NINO MD; [address], [place] [address]  [id number] ROSANNA BAILEY MD; [address], [facility], [place] [address]  [id number] BRUNNER, EMILY A MD; [facility], [address],, [place] [address]  Pharmacies that dispensed prescriptions listed  ----------------------------------------------------------------------------------------------------------------------------------  [id number] [facility] : [facility] # [id number], [address] [address],  Patients that match search criteria  ----------------------------------------------------------------------------------------------------------------------------------  [id number] FATMATA MIRE, [facility] 77;  KALI MCKINLEY, [place] [address]  [id number] FATMATA MIRE S, [facility] 77;  KALI [address]INLEY [address] [address]  [id number] FATMATA MIRE DEL, [facility] 77;  [place] [address]  [id number] WHITE RAISA S, [facility] 77; , [facility] [address]  MED Summary  This section displays cumulative MED values by unique recipient. The MED Max value is the maximum occurrence of cumulative MED  sustained for any 3 consecutive days. This value is calculated based on prescriptions dispensed during the date range requested.  -----------------------------------------------------------------------------------------------------------------------------------  RAISA HERNANDEZ; 1975;  [facility] [address]

## 2021-06-15 NOTE — PROGRESS NOTES
Correct pharmacy verified with patient and confirmed in snapshot? [x] yes []no    Charge captured ? [x] yes  [] no    Medications Phoned  to Pharmacy [] yes [x]no  Name of Pharmacist:  List Medications, including dose, quantity and instructions      Medication Prescriptions given to patient   [] yes  [x] no   List the name of the drug the prescription was written for.       Medications ordered this visit were e-scribed.  Verified by order class [x] yes  [] no  Suboxone 8-2 mg    Medication changes or discontinuations were communicated to patient's pharmacy: [] yes  [x] no    UA collected [x] yes  [] no    [place] Prescription Monitoring Program Reviewed? [x] yes  [] no    Referrals were made to: none     Future appointment was made: [x] yes  [] no    Dictation completed at time of chart check: [x] yes  [] no    I have checked the documentation for today s encounters and the above information has been reviewed and completed.

## 2021-06-15 NOTE — PROGRESS NOTES
"Addiction  Nurse Only Visit Note    1/2/2018  Date of last visit: 12/14/17    Reason for today's visit:   Chief Complaint   Patient presents with     Follow-up       Vitals:    01/02/18 1004   BP: 121/71   Patient Site: Right Arm   Patient Position: Sitting   Cuff Size: Adult Regular   Pulse: 87   Temp: 98.6  F (37  C)   TempSrc: Oral   Weight: 145 lb (65.8 kg)   Height: 5' 1\" (1.549 m)        If having pain, who will address pain:  No pain    Is pt assisted: No    Urine for toxicology sent today: yes    Last UA date: 12/14/17  Reviewed with patient: yes  Results: Dam neg    AIMS:     Pill count: NA  (Controlled substance only)  Medications needing renewal: see note    Substance use history:    Using alcohol:No  Using street drugs or unprescribed medications: No  Using opioids other that Suboxone:No  Using tobacco:Yes: Describe: 5 cigs daily    Recovery environment:  Attending formal chemical dependency programming: No  Attending \"outside\" mutual help meetings: Yes: Describe: weekly  Has a chemical dependency sponsor: No  Has other elements of structure: Yes: Describe: taking care of children, looking for housing  Current Living Situation: shelter    Cravings: yes 3/5    Sleep: yes interrupted sleep, sleeps 4 hr noc    Depression: yes 5/5    Anxiety: yes 3/5    Panic Attacks: no    Paranoia: no    Hallucinations: no    Suicidal Ideation: no    Homicidal Ideation:no  Comments: none    Physical Problems: no    [place]  was reviewed prior to seeing patient today. See below for embedded report.    Note: Here with two young daughters today. Jennifer is discouraged by not being able to find housing. Says she needs to find a job, \"I haven't worked in 15 yrs, but I got to find a job to get out of this shelter.\" No issues or concern regarding suboxone. Attends meetings at shelter, residents have been able to get weekly meetings now.    Suboxone 8/2mg sl film, takes bid, #58 0-RF, called to pharmacy, spoke with pharmacist " Felisa, order read back for accuracy. Next appt 18 with Dr. Alva.    Patient Instructions   Continue Medications as ordered.  No alcohol or unprescribed drug use.  No driving, if sedated.  Come to the Emergency Room if not feeling safe.  Call the clinic with any questions [phone].  Follow up as directed, for your appointments, per your After Visit Summary Form.      Call the clinic if any concerns: [facility] [phone]  [facility] [phone]  and Report to the emergency room if you feel like harming yourself or others or are psychotic        Jane NOHEMY Devries    2018 10:10 AM    FATMATA KLINE  Search Criteria: Last Name 'fatmata' and First Name 'aiden' and  =  and Request Period = '10/04/17' to  ' - 4 out of 4 Recipients Selected.  Fill Date Product, Str, Form Qty Days Pt ID Prescriber Written RX# N/R* Pharm **MED+  ---------- -------------------------------- ------ ---- --------- ---------- ---------- ------------ ----- --------- ------  2017 GABAPENTIN 300 MG CAPSULE 180.00 30 [id number] 2017 [id number] N [id number] 00.0  2017 SUBOXONE 8 MG-2 MG SL FILM 38.00 19 [id number] 2017 [id number] N [id number] 480.0  2017 SUBOXONE 8 MG-2 MG SL FILM 28.00 14 [id number] 2017 [id number] N [id number] 480.0  2017 GABAPENTIN 300 MG CAPSULE 180.00 30 [id number] 2017 [id number] N [id number] 00.0  11/15/2017 SUBOXONE 8 MG-2 MG SL FILM 32.00 16 [id number] 2017 [id number] N [id number] 480.0  2017 SUBOXONE 8 MG-2 MG SL FILM 24.00 12 [id number] 2017 [id number] N [id number] 480.0  10/25/2017 SUBOXONE 8 MG-2 MG SL FILM 16.00 8 [id number] 10/25/2017 [id number] N [id number] 480.0  10/19/2017 SUBOXONE 8 MG-2 MG SL FILM 12.00 6 [id number] 10/19/2017 [id number] N [id number] 480.0  10/17/2017 GABAPENTIN 300 MG CAPSULE 180.00 30 [id number] 10/17/2017 [id number] N [id number]  00.0  10/12/2017 SUBOXONE 8 MG-2 MG SL FILM 14.00 7 [id number] 10/12/2017 [id number] N [id number] 480.0  10/04/2017 BUPRENORPHIN-NALOXON 8-2 MG SL 14.00 7 [id number] 10/04/2017 [id number] N [id number] 480.0  *N/R N=New R=Refill  +MED Daily  Prescribers for prescriptions listed  ----------------------------------------------------------------------------------------------------------------------------------  [id number] DANGELO NINO MD; [address] [place], [place] [id number], [place] [address]  [id number] ROSANNA BAILEY MD; [address], [facility], [place] [address]  [id number] BRUNNER, EMILY A MD; [facility], [address] [place] [address] [address], [place] [address]  Pharmacies that dispensed prescriptions listed  ----------------------------------------------------------------------------------------------------------------------------------  [id number] [facility] : [facility] # [id number], [address], [facility] [address],  Patients that match search criteria  ----------------------------------------------------------------------------------------------------------------------------------  [id number] FATMATA KLINE, [facility] 77;  KALI ARMSTRONG, [place] [address]  [id number] FATMATA ANTONIO, [facility] 77;  KALI ARMSTRONG , [place] [address]  [id number] FATMATA OCHOA, [facility] 77;  KALI ARMSTRONG, [place] [address]  [id number] FATMATA ANTONIO, [facility] 77; , [place] [address]  MED Summary  This section displays cumulative MED values by unique recipient. The MED Max value is the maximum occurrence of cumulative MED  sustained for any 3 consecutive days. This value is calculated based on prescriptions dispensed during the date range requested.  -----------------------------------------------------------------------------------------------------------------------------------  [address] RAISA AVILEZ; [address], [place] [address]

## 2021-06-15 NOTE — PROGRESS NOTES
Patient no showed for DA. This is patient's schedule no show for a DA. This therapist attempted to call patient but patient' number is not working. Patient should not be scheduled again with this therapist due to no shows.

## 2021-06-15 NOTE — PROGRESS NOTES
Patient here today for follow up of medication management. States she has maintained sobriety. States depression 3/5 deneis SI/HI, anxiety 2/5.

## 2021-06-16 NOTE — TELEPHONE ENCOUNTER
Telephone Encounter by Dahiana Adkins, RN at 2/14/2019 11:00 AM     Author: Dahiana Adkins RN Service: Behavioral Author Type: Registered Nurse    Filed: 2/14/2019 11:25 AM Encounter Date: 2/14/2019 Status: Addendum    : Dahiana Adkins RN (Registered Nurse)    Related Notes: Original Note by Dahiana Adkins RN (Registered Nurse) filed at 2/14/2019 11:04 AM       PA initiated for buprenorphine via fax.     Last prescribed on 12/18/18, 30 day supply with one refill. Pt never picked up either of those, but is now requesting a refill from her pharmacy, which triggered a PA. Pulled the  and patient last filled buprenorphine on 11/23/18. Called patient and she states that she found an old bottle lying around and has had enough since yesterday. Requested she come in for a UA, but patient states she doesn't have a car and can't make it in until her appt with Dr. Brunner on 2/19/19.

## 2021-06-16 NOTE — TELEPHONE ENCOUNTER
Telephone Encounter by Mindi Tesfaye LPN at 1/6/2020  2:11 PM     Author: Mindi Tesfaye LPN Service: -- Author Type: Licensed Nurse    Filed: 1/6/2020  2:59 PM Encounter Date: 1/6/2020 Status: Signed    : Mindi Tesfaye LPN (Licensed Nurse)       Last seen 12/13/19  Refill provided on 12/13/2019 for Suboxone  8-2 mg Qty: 60 Rx: 2    buprenorphine-naloxone (SUBOXONE) 8-2 mg Film per sublingual film 60 Film 2 12/13/2019  No   Sig - Route: Place 1 Film under the tongue 2 (two) times a day. - Sublingual       Phone call made to [facility] and spoke to Ildefonso. According to staff, the Rx we sent in December is gone due to the pharmacy system error and they are asking us to re-send a script.      shows last fill on 12/9/2019; 60 films for 30 days, prescribed on 10/18/19.  Pt has been getting Clonazepam 0.5 mg tabs from [facility] since 11/12/19

## 2021-06-16 NOTE — TELEPHONE ENCOUNTER
Telephone Encounter by Dahiana Adkins RN at 8/12/2019 11:14 AM     Author: Dahiana Adkins RN Service: Behavioral Author Type: Registered Nurse    Filed: 8/12/2019 11:31 AM Encounter Date: 8/11/2019 Status: Signed    : Dahiana Adkins RN (Registered Nurse)       Date of Last Office Visit: 5/16/19  Date of Next Office Visit: 8/15/19  No shows since last visit: 0  Cancellations since last visit: 0  ED visits since last visit:  0    Medication baclofen 20 mg date last ordered: 3/15/19  Qty: 90  Refills: 2    Lapse in therapy greater than 7 days: Yes  Medication refill request verified as identical to current order: Yes  Result of Last DAM, VPA, Li+ Level, CBC, or Carbamazepine Level (at or since last visit): Negative DAM on 5/16/19     [] Medication refilled per [facility] M-1.   [x] Medication unable to be refilled by RN due to criteria not met as indicated below:     []Eligibility - not seen in last year    []Supervision - no future appointment    [x]Compliance - Therapy lapse     []Verification - order discrepancy    []Controlled Medication    []Medication not included in RN Protocol    []90 - day supply request    []Other     Current Medication list:     Medication Plan of Care at last office visit with MD/CNP:

## 2021-06-17 NOTE — PROGRESS NOTES
"Addiction  Nurse Only Visit Note     4/3/2018  Date of last visit: 1/31/18    Reason for today's visit:   Chief Complaint   Patient presents with     Follow-up       Vitals:    04/03/18 1046   BP: 107/70   Patient Site: Right Arm   Patient Position: Sitting   Cuff Size: Adult Regular   Pulse: 91   Temp: 98.1  F (36.7  C)   TempSrc: Oral   Weight: 149 lb (67.6 kg)   Height: 5' 1\" (1.549 m)        If having pain, who will address pain:  No pain    Is pt assisted: No    Urine for toxicology sent today: yes    Last UA date: 3/6/18  Reviewed with patient: yes  Results: DAM neg,BUP pos    AIMS:     Pill count: NA  (Controlled substance only)  Medications needing renewal: see note    Substance use history:    Using alcohol:No  Using street drugs or unprescribed medications: No  Using opioids other that Suboxone:No  Using tobacco:Yes: Describe: 5 cigs daily    Recovery environment:  Attending formal chemical dependency programming: No  Attending \"outside\" mutual help meetings: Yes: Describe: at the shelter  Has a chemical dependency sponsor: No  Has other elements of structure: Yes: Describe: taking care of kids, search for housing, meetings at the shelter  Current Living Situation: shelter    Cravings: yes ocassional for benzos    Sleep: yes sleeps 4 hr/noc, wakes up frequently    Depression: yes 7/10    Anxiety: yes 4/10    Panic Attacks: no    Paranoia: no    Hallucinations: no    Suicidal Ideation: no    Homicidal Ideation:no  Comments: none    Physical Problems: no, has some teeth that need to be extracted    [place]  was reviewed prior to seeing patient today. See below for embedded report.    Note: No issues or concerns with Suboxone. Continues to work on trying to find housing for her children, recently was turned down due to old legal issues (12-15 yr ago). She is going to pursue expungement for those legal issues. Also interested in therapy for anxiety. Goes to recovery meetings offered at shelter.    Suboxone 8/2 " mg sl tab, takes bid, #40 0-RF, called to pharmacy, spoke with pharmacist Karen, order read back for accuracy. Next appt 18 Nurse Only, 18 with Dr Brunner.      Patient Instructions   Continue Medications as ordered.  No alcohol or unprescribed drug use.  No driving, if sedated.  Come to the Emergency Room if not feeling safe.  Call the clinic with any questions [phone].  Follow up as directed, for your appointments, per your After Visit Summary Form.      Call the clinic if any concerns: [facility] [phone]  [facility] [phone]  and Report to the emergency room if you feel like harming yourself or others or are psychotic        Jane Devries    4/3/2018 10:47 AM      FATMATA KLINE  Search Criteria: Last Name 'fatmata' and First Name 'aiden' and  = ' and Request Period =  to  ' - 4 out of 4 Recipients Selected.  Fill Date Product, Str, Form Qty Days Pt ID Prescriber Written RX# N/R* Pharm **MED+  ---------- -------------------------------- ------ ---- --------- ---------- ---------- ------------ ----- --------- ------  2018 GABAPENTIN 300 MG CAPSULE 180.00 30 [id number] 2018 [id number] N [id number] 00.0  2018 BUPRENORPHIN-NALOXON 8-2 MG SL 60.00 30 [id number] 2018 [id number] N [id number] 480.0  2018 GABAPENTIN 300 MG CAPSULE 180.00 30 [id number] 2018 [id number] N [id number] 00.0  2018 BUPRENORPHIN-NALOXON 8-2 MG SL 60.00 30 [id number] 2018 [id number] N [id number] 480.0  2018 GABAPENTIN 300 MG CAPSULE 180.00 30 [id number] 2018 [id number] N [id number] 00.0  2018 SUBOXONE 8 MG-2 MG SL FILM 58.00 29 [id number] 2018 [id number] N [id number] 480.0  *N/R N=New R=Refill  +MED Daily  Prescribers for prescriptions listed  ----------------------------------------------------------------------------------------------------------------------------------  [id number]  DANGELO NINO MD; [address] [facility] [address], [place] [address]  [id number] BRUNNER, EMILY A MD; [facility], [address] [facility] [address], [place] [address]  Pharmacies that dispensed prescriptions listed  ----------------------------------------------------------------------------------------------------------------------------------  [id number] [facility] : [id number] [address] [place] [address],  Patients that match search criteria  ----------------------------------------------------------------------------------------------------------------------------------  [id number] WHITE RAISA,  79;  [place] [address]  [id number] WHITE RAISA S,  79;  [address] [address]  [id number] WHITE RAISA DEL,  79;  [place] [address]  [id number] WHITE RAISA S,  79; , [place] [address]  MED Summary  This section displays cumulative MED values by unique recipient. The MED Max value is the maximum occurrence of cumulative MED  sustained for any 3 consecutive days. This value is calculated based on prescriptions dispensed during the date range requested.  -----------------------------------------------------------------------------------------------------------------------------------  [address] RAISA AVILEZ; 1975[address] [address]

## 2021-06-18 NOTE — PROGRESS NOTES
Correct pharmacy verified with patient and confirmed in snapshot? [x] yes []no    Charge captured ? [x] yes  [] no    Medications Phoned  to Pharmacy [] yes [x]no  Name of Pharmacist:  List Medications, including dose, quantity and instructions      Medication Prescriptions given to patient   [] yes  [x] no   List the name of the drug the prescription was written for.       Medications ordered this visit were e-scribed.  Verified by order class [x] yes  [] no  Suboxone 8-2 mg  hydroxyzine 50 mg  Abilify 5 mg    Medication changes or discontinuations were communicated to patient's pharmacy: [] yes  [x] no    UA collected [x] yes  [] no    [place] Prescription Monitoring Program Reviewed? [x] yes  [] no    Referrals were made to: none     Future appointment was made: [x] yes  [] no    Dictation completed at time of chart check: [] yes  [x] no    I have checked the documentation for today s encounters and the above information has been reviewed and completed.

## 2021-06-19 NOTE — PROGRESS NOTES
Correct pharmacy verified with patient and confirmed in snapshot? [x] yes []no    Charge captured ? [x] yes  [] no    Medications Phoned  to Pharmacy [] yes [x]no  Name of Pharmacist:  List Medications, including dose, quantity and instructions      Medication Prescriptions given to patient   [] yes  [x] no   List the name of the drug the prescription was written for.       Medications ordered this visit were e-scribed.  Verified by order class [x] yes  [] no  Abilify 10 mg; Gabapentin 300 mg; Hydroxyzine 50 mg; Suboxone 8-2 mg   [facility]  Medication changes or discontinuations were communicated to patient's pharmacy: [x] yes  [] no  Abilify 5 mg sent date 7/2/18; Gabapentin 300 mg; Hydroxyzine 50 mg sent date 6/25/18   [facility] [address]   UA collected [x] yes  [] no    [place] Prescription Monitoring Program Reviewed? [x] yes  [] no    Referrals were made to: None     Future appointment was made: [x] yes  [] no  08/06/18  Dictation completed at time of chart check: [] yes  [x] no    I have checked the documentation for today s encounters and the above information has been reviewed and completed.

## 2021-06-19 NOTE — PROGRESS NOTES
Correct pharmacy verified with patient and confirmed in snapshot? [x] yes []no    Charge captured ? [x] yes  [] no    Medications Phoned  to Pharmacy [] yes [x]no  Name of Pharmacist:  List Medications, including dose, quantity and instructions      Medication Prescriptions given to patient   [] yes  [x] no   List the name of the drug the prescription was written for.       Medications ordered this visit were e-scribed.  Verified by order class [x] yes  [] no  Suboxone 8-2 mg  Baclofen 10 mg    Medication changes or discontinuations were communicated to patient's pharmacy: [] yes  [x] no    UA collected [x] yes  [] no    [place] Prescription Monitoring Program Reviewed? [x] yes  [] no    Referrals were made to: none     Future appointment was made: [x] yes  [] no    Dictation completed at time of chart check: [] yes  [x] no    I have checked the documentation for today s encounters and the above information has been reviewed and completed.

## 2021-06-20 NOTE — PROGRESS NOTES
Correct pharmacy verified with patient and confirmed in snapshot? [x] yes []no    Charge captured ? [x] yes  [] no    Medications Phoned  to Pharmacy [] yes [x]no  Name of Pharmacist:  List Medications, including dose, quantity and instructions      Medication Prescriptions given to patient   [] yes  [x] no   List the name of the drug the prescription was written for.       Medications ordered this visit were e-scribed.  Verified by order class [x] yes  [] no    Medication changes or discontinuations were communicated to patient's pharmacy: [] yes  [x] no    UA collected [x] yes  [] no    [place] Prescription Monitoring Program Reviewed? [x] yes  [] no    Referrals were made to:  none  Future appointment was made: [x] yes  [] no    Dictation completed at time of chart check: [] yes  [x] no    I have checked the documentation for today s encounters and the above information has been reviewed and completed.

## 2021-06-21 NOTE — PROGRESS NOTES
Correct pharmacy verified with patient and confirmed in snapshot? [x] yes []no    Charge captured ? [x] yes  [] no    Medications Phoned  to Pharmacy [] yes [x]no  Name of Pharmacist:  List Medications, including dose, quantity and instructions      Medication Prescriptions given to patient   [] yes  [x] no   List the name of the drug the prescription was written for.       Medications ordered this visit were e-scribed.  Verified by order class [x] yes  [] no  Abilify 10 mg  Baclofen 20 mg  Suboxone 8-2 mg  Gabapentin 300 mg    Medication changes or discontinuations were communicated to patient's pharmacy: [] yes  [x] no    UA collected [x] yes  [] no    [place] Prescription Monitoring [place] Reviewed? [x] yes  [] no    Referrals were made to: none      Future appointment was made: [x] yes  [] no    Dictation completed at time of chart check: [] yes  [x] no    I have checked the documentation for today s encounters and the above information has been reviewed and completed.

## 2021-06-22 NOTE — PROGRESS NOTES
Correct pharmacy verified with patient and confirmed in snapshot? [x] yes []no    Charge captured ? [x] yes  [] no    Medications Phoned  to Pharmacy [] yes [x]no  Name of Pharmacist:  List Medications, including dose, quantity and instructions      Medication Prescriptions given to patient   [] yes  [x] no   List the name of the drug the prescription was written for.       Medications ordered this visit were e-scribed.  Verified by order class [x] yes  [] no  Abilify 10 mg; Suboxone 8-2 mg; Gabapentin 300 mg; hydroxyzine HCl 50 mg; propranolol 20 mg; Zoloft 100 mg    Medication changes or discontinuations were communicated to patient's pharmacy: [] yes  [x] no    UA collected [x] yes  [] no    [place] Prescription Monitoring Program Reviewed? [x] yes  [] no    Referrals were made to: None     Future appointment was made: [x] yes  [] no  02/19/2019  Dictation completed at time of chart check: [] yes  [x] no    I have checked the documentation for today s encounters and the above information has been reviewed and completed.

## 2021-06-23 NOTE — TELEPHONE ENCOUNTER
Provider completed form and signed.    Writer faxed back to DREW Wong # [phone].      Pt verbally asked for a copy to be mailed out to her.    Confirmed next appointment for 2/19/19 @ 11:15am    Sent originals to scanning

## 2021-06-24 NOTE — TELEPHONE ENCOUNTER
Spoke with Dr. Brunner. She is requesting that patient do a UA sample near her home in [place]. Pt will call us back with a clinic that is near after she can arrange transportation. At that time we can fax the UA order to them.

## 2021-06-24 NOTE — TELEPHONE ENCOUNTER
Date of Last Office Visit: 12/18/18  Date of Next Office Visit: 2/19/19  No shows since last visit: none  Cancellations since last visit: none  ED visits since last visit: none    Medication Propranolol 20 mg date last ordered: 12/18/2018  Qty: 14  Refills: 0    Brunner, Emily A, MD Brunner, Emily A, MD      Disp Refills Start End    propranolol (INDERAL) 20 MG tablet 14 tablet 0 12/18/2018     Sig - Route: Take 1 tablet (20 mg total) by mouth 2 (two) times a day. - Oral      Lapse in therapy greater than 7 days: YES  Medication refill request verified as identical to current order: yes  Result of Last DAM, VPA, Li+ Level, CBC, or Carbamazepine Level (at or since last visit): Negative UTOX, positive BUP on 12/18/18        []Eligibility - not seen in last year    []Supervision - no future appointment    [x]Compliance   - lapse in therapy    []Verification - order discrepancy    []Controlled Medication    []90 - day supply request    [x]Other LPN pending medications    Current Medication list:      albuterol (PROVENTIL HFA;VENTOLIN HFA) 90 mcg/actuation inhaler Inhale 2 puffs every 4 (four) hours as needed for wheezing.   ARIPiprazole (ABILIFY) 10 MG tablet Take 1 tablet (10 mg total) by mouth daily.   baclofen (LIORESAL) 20 MG tablet Take 1 tablet (20 mg total) by mouth 3 (three) times a day.   buprenorphine-naloxone (SUBOXONE SL TABLET) 8-2 mg Subl per sublingual tab Place 1 tablet under the tongue 2 (two) times a day DISSOLVE 1 TABLET UNDER THE TONGU TWICE A DAY AS DIRECTED.   gabapentin (NEURONTIN) 300 MG capsule Take 2 capsules (600 mg total) by mouth 3 (three) times a day.   hydrOXYzine HCl (ATARAX) 50 MG tablet TAKE 1 TABLET (50 MG TOTAL) BY MOUTH EVERY 6 (SIX) HOURS AS NEEDED FOR ANXIETY..   nicotine polacrilex (COMMIT) 2 MG lozenge Apply 1 lozenge (2 mg total) to the mouth or throat as needed for smoking cessation.   propranolol (INDERAL) 20 MG tablet Take 1 tablet (20 mg total) by mouth 2 (two) times a day.  "  sertraline (ZOLOFT) 100 MG tablet Take 2 tablets (200 mg total) by mouth daily.         Medication Plan of Care at last office visit with MD/CNP:    PLAN:  1. Patient continued on her current suboxone dose of 8/2 mg two times a day. She was given a 30-day supply of this medication today with 1 refill.  2. Patient was continued on baclofen 20 mg three times a day for sedative cravings. This is an \"off-label\" use of this medication.     3. Continue on current psychiatric medications including gabapentin, abilify, zoloft and trazodone. Encouraged her to obtain a psychiatric consult and also a diagnostic consult to start psychology. Patient does not present as an acute safety risk.  4.. Patient continues to have financial concerns as well as childcare issues.  5. Patient encouraged to continue with [employer] and strongly encouraged her to try attending meetings in person. Given her significant anxiety she may best accomplish this by first doing individual therapy regarding her anxiety.   6. Patient advised to quit smoking and at this time reports being precontemplative.   6. Patient to rtc in 2 months and sooner prn. Urine toxicology with + bup and negative DAM.   7. Patient aware she can transfer care to [place] prn but is welcome to return here if she desires.        [place], [place], and [place] : NA  "

## 2021-06-24 NOTE — TELEPHONE ENCOUNTER
Checked on the status of the PA for the suboxone films with the insurance company. They state that a PA was not needed for the films. Called the pharmacy and they re-ran the script and it went through without a problem. No PA required for the films.

## 2021-06-24 NOTE — TELEPHONE ENCOUNTER
PA denied for buprenorphine tablets. Dr. Brunner canceled that script and sent one over for the films. Verified with pharmacy and that is triggering a PA as well. Waiting on PA form.

## 2021-06-25 NOTE — PROGRESS NOTES
Correct pharmacy verified with patient and confirmed in snapshot? [x] yes []no    Charge captured ? [x] yes  [] no    Medications Phoned  to Pharmacy [] yes [x]no  Name of Pharmacist:  List Medications, including dose, quantity and instructions      Medication Prescriptions given to patient   [] yes  [x] no   List the name of the drug the prescription was written for.       Medications ordered this visit were e-scribed.  Verified by order class [x] yes  [] no abilify 10mg, baclofen 20mg, suboxone 8-2mg, gabapentin 300mg, hydroxyzine 50mg, zoloft 100mg     Medication changes or discontinuations were communicated to patient's pharmacy: [] yes  [x] no    UA collected [x] yes  [] no    [place] Prescription Monitoring Program Reviewed? [x] yes  [] no    Referrals were made to:  none    Future appointment was made: [x] yes  [] no    Dictation completed at time of chart check: [] yes  [x] no    I have checked the documentation for today s encounters and the above information has been reviewed and completed.

## 2021-07-03 NOTE — ADDENDUM NOTE
Addendum Note by Ban Portillo RN at 3/6/2018  2:43 PM     Author: Ban Portillo RN Service: -- Author Type: Registered Nurse    Filed: 3/6/2018  2:43 PM Encounter Date: 3/6/2018 Status: Signed    : Ban Portillo RN (Registered Nurse)    Addended by: BAN PORTILLO on: 3/6/2018 02:43 PM        Modules accepted: Orders

## 2021-07-03 NOTE — ADDENDUM NOTE
Addendum Note by Dangelo Nino MD at 1/18/2017  3:16 PM     Author: Dangelo Nino MD Service: -- Author Type: Physician    Filed: 1/18/2017  3:16 PM Encounter Date: 1/18/2017 Status: Signed    : Dangelo Nino MD (Physician)    Addended by: DANGELO NINO on: 1/18/2017 03:16 PM        Modules accepted: Orders